# Patient Record
Sex: FEMALE | Race: WHITE | Employment: UNEMPLOYED | URBAN - METROPOLITAN AREA
[De-identification: names, ages, dates, MRNs, and addresses within clinical notes are randomized per-mention and may not be internally consistent; named-entity substitution may affect disease eponyms.]

---

## 2020-02-06 ENCOUNTER — APPOINTMENT (OUTPATIENT)
Dept: GENERAL RADIOLOGY | Age: 55
End: 2020-02-06
Payer: MEDICARE

## 2020-02-06 ENCOUNTER — APPOINTMENT (OUTPATIENT)
Dept: CT IMAGING | Age: 55
End: 2020-02-06
Payer: MEDICARE

## 2020-02-06 ENCOUNTER — HOSPITAL ENCOUNTER (EMERGENCY)
Age: 55
Discharge: HOME | End: 2020-02-06
Attending: EMERGENCY MEDICINE
Payer: MEDICARE

## 2020-02-06 VITALS
HEIGHT: 68 IN | TEMPERATURE: 97.6 F | WEIGHT: 165 LBS | RESPIRATION RATE: 16 BRPM | SYSTOLIC BLOOD PRESSURE: 129 MMHG | HEART RATE: 75 BPM | OXYGEN SATURATION: 95 % | BODY MASS INDEX: 25.01 KG/M2 | DIASTOLIC BLOOD PRESSURE: 73 MMHG

## 2020-02-06 LAB
ALBUMIN SERPL-MCNC: 4.1 G/DL (ref 3.5–5.2)
ALP BLD-CCNC: 76 U/L (ref 35–104)
ALT SERPL-CCNC: 9 U/L (ref 0–32)
ANION GAP SERPL CALCULATED.3IONS-SCNC: 11 MMOL/L (ref 7–16)
AST SERPL-CCNC: 8 U/L (ref 0–31)
BASOPHILS ABSOLUTE: 0.03 E9/L (ref 0–0.2)
BASOPHILS RELATIVE PERCENT: 0.4 % (ref 0–2)
BILIRUB SERPL-MCNC: 0.2 MG/DL (ref 0–1.2)
BUN BLDV-MCNC: 13 MG/DL (ref 6–20)
CALCIUM SERPL-MCNC: 8.9 MG/DL (ref 8.6–10.2)
CHLORIDE BLD-SCNC: 99 MMOL/L (ref 98–107)
CO2: 23 MMOL/L (ref 22–29)
CREAT SERPL-MCNC: 1 MG/DL (ref 0.5–1)
EOSINOPHILS ABSOLUTE: 0.11 E9/L (ref 0.05–0.5)
EOSINOPHILS RELATIVE PERCENT: 1.6 % (ref 0–6)
GFR AFRICAN AMERICAN: >60
GFR NON-AFRICAN AMERICAN: 58 ML/MIN/1.73
GLUCOSE BLD-MCNC: 156 MG/DL (ref 74–99)
HCT VFR BLD CALC: 40.3 % (ref 34–48)
HEMOGLOBIN: 13.3 G/DL (ref 11.5–15.5)
IMMATURE GRANULOCYTES #: 0.02 E9/L
IMMATURE GRANULOCYTES %: 0.3 % (ref 0–5)
INFLUENZA A BY PCR: NOT DETECTED
INFLUENZA B BY PCR: NOT DETECTED
LYMPHOCYTES ABSOLUTE: 2.35 E9/L (ref 1.5–4)
LYMPHOCYTES RELATIVE PERCENT: 33.5 % (ref 20–42)
MCH RBC QN AUTO: 32.2 PG (ref 26–35)
MCHC RBC AUTO-ENTMCNC: 33 % (ref 32–34.5)
MCV RBC AUTO: 97.6 FL (ref 80–99.9)
MONOCYTES ABSOLUTE: 0.48 E9/L (ref 0.1–0.95)
MONOCYTES RELATIVE PERCENT: 6.8 % (ref 2–12)
NEUTROPHILS ABSOLUTE: 4.03 E9/L (ref 1.8–7.3)
NEUTROPHILS RELATIVE PERCENT: 57.4 % (ref 43–80)
PDW BLD-RTO: 12.7 FL (ref 11.5–15)
PLATELET # BLD: 378 E9/L (ref 130–450)
PMV BLD AUTO: 8.8 FL (ref 7–12)
POTASSIUM REFLEX MAGNESIUM: 4 MMOL/L (ref 3.5–5)
RBC # BLD: 4.13 E12/L (ref 3.5–5.5)
SODIUM BLD-SCNC: 133 MMOL/L (ref 132–146)
TOTAL PROTEIN: 6.8 G/DL (ref 6.4–8.3)
WBC # BLD: 7 E9/L (ref 4.5–11.5)

## 2020-02-06 PROCEDURE — 80053 COMPREHEN METABOLIC PANEL: CPT

## 2020-02-06 PROCEDURE — 72125 CT NECK SPINE W/O DYE: CPT

## 2020-02-06 PROCEDURE — 36415 COLL VENOUS BLD VENIPUNCTURE: CPT

## 2020-02-06 PROCEDURE — 85025 COMPLETE CBC W/AUTO DIFF WBC: CPT

## 2020-02-06 PROCEDURE — 70450 CT HEAD/BRAIN W/O DYE: CPT

## 2020-02-06 PROCEDURE — 87502 INFLUENZA DNA AMP PROBE: CPT

## 2020-02-06 PROCEDURE — 99284 EMERGENCY DEPT VISIT MOD MDM: CPT

## 2020-02-06 PROCEDURE — 71101 X-RAY EXAM UNILAT RIBS/CHEST: CPT

## 2020-02-06 ASSESSMENT — PAIN DESCRIPTION - DESCRIPTORS: DESCRIPTORS: ACHING;DISCOMFORT

## 2020-02-06 ASSESSMENT — PAIN DESCRIPTION - PAIN TYPE: TYPE: ACUTE PAIN

## 2020-02-06 ASSESSMENT — PAIN DESCRIPTION - ONSET: ONSET: ON-GOING

## 2020-02-06 ASSESSMENT — PAIN DESCRIPTION - FREQUENCY: FREQUENCY: CONTINUOUS

## 2020-02-06 ASSESSMENT — PAIN DESCRIPTION - LOCATION: LOCATION: RIB CAGE;HEAD

## 2020-02-06 ASSESSMENT — PAIN SCALES - GENERAL: PAINLEVEL_OUTOF10: 5

## 2021-04-11 ENCOUNTER — APPOINTMENT (OUTPATIENT)
Dept: GENERAL RADIOLOGY | Age: 56
End: 2021-04-11
Payer: MEDICARE

## 2021-04-11 ENCOUNTER — HOSPITAL ENCOUNTER (OUTPATIENT)
Age: 56
Setting detail: OBSERVATION
Discharge: HOME | End: 2021-04-14
Attending: EMERGENCY MEDICINE | Admitting: FAMILY MEDICINE
Payer: MEDICARE

## 2021-04-11 DIAGNOSIS — R07.9 CHEST PAIN, UNSPECIFIED TYPE: Primary | ICD-10-CM

## 2021-04-11 PROBLEM — F41.9 ANXIETY: Status: ACTIVE | Noted: 2021-04-11

## 2021-04-11 PROBLEM — I44.7 LBBB (LEFT BUNDLE BRANCH BLOCK): Status: ACTIVE | Noted: 2021-04-11

## 2021-04-11 PROBLEM — G80.9 CEREBRAL PALSY (HCC): Status: ACTIVE | Noted: 2021-04-11

## 2021-04-11 PROBLEM — I10 HTN (HYPERTENSION): Status: ACTIVE | Noted: 2021-04-11

## 2021-04-11 LAB
ALBUMIN SERPL-MCNC: 3.8 G/DL (ref 3.5–5.2)
ALP BLD-CCNC: 69 U/L (ref 35–104)
ALT SERPL-CCNC: 11 U/L (ref 0–32)
ANION GAP SERPL CALCULATED.3IONS-SCNC: 7 MMOL/L (ref 7–16)
AST SERPL-CCNC: 16 U/L (ref 0–31)
BASOPHILS ABSOLUTE: 0.05 E9/L (ref 0–0.2)
BASOPHILS RELATIVE PERCENT: 0.6 % (ref 0–2)
BILIRUB SERPL-MCNC: <0.2 MG/DL (ref 0–1.2)
BUN BLDV-MCNC: 16 MG/DL (ref 6–20)
CALCIUM SERPL-MCNC: 8.9 MG/DL (ref 8.6–10.2)
CHLORIDE BLD-SCNC: 104 MMOL/L (ref 98–107)
CO2: 25 MMOL/L (ref 22–29)
CREAT SERPL-MCNC: 0.7 MG/DL (ref 0.5–1)
D DIMER: <200 NG/ML DDU
EOSINOPHILS ABSOLUTE: 0.12 E9/L (ref 0.05–0.5)
EOSINOPHILS RELATIVE PERCENT: 1.4 % (ref 0–6)
GFR AFRICAN AMERICAN: >60
GFR NON-AFRICAN AMERICAN: >60 ML/MIN/1.73
GLUCOSE BLD-MCNC: 103 MG/DL (ref 74–99)
HCT VFR BLD CALC: 40.6 % (ref 34–48)
HEMOGLOBIN: 13.9 G/DL (ref 11.5–15.5)
IMMATURE GRANULOCYTES #: 0.03 E9/L
IMMATURE GRANULOCYTES %: 0.4 % (ref 0–5)
LYMPHOCYTES ABSOLUTE: 2.52 E9/L (ref 1.5–4)
LYMPHOCYTES RELATIVE PERCENT: 29.8 % (ref 20–42)
MCH RBC QN AUTO: 33.6 PG (ref 26–35)
MCHC RBC AUTO-ENTMCNC: 34.2 % (ref 32–34.5)
MCV RBC AUTO: 98.1 FL (ref 80–99.9)
MONOCYTES ABSOLUTE: 0.62 E9/L (ref 0.1–0.95)
MONOCYTES RELATIVE PERCENT: 7.3 % (ref 2–12)
NEUTROPHILS ABSOLUTE: 5.12 E9/L (ref 1.8–7.3)
NEUTROPHILS RELATIVE PERCENT: 60.5 % (ref 43–80)
PDW BLD-RTO: 12.5 FL (ref 11.5–15)
PLATELET # BLD: 210 E9/L (ref 130–450)
PMV BLD AUTO: 9.5 FL (ref 7–12)
POTASSIUM REFLEX MAGNESIUM: 3.6 MMOL/L (ref 3.5–5)
PRO-BNP: 321 PG/ML (ref 0–125)
RBC # BLD: 4.14 E12/L (ref 3.5–5.5)
REASON FOR REJECTION: NORMAL
REJECTED TEST: NORMAL
SODIUM BLD-SCNC: 136 MMOL/L (ref 132–146)
T4 TOTAL: 4.8 MCG/DL (ref 4.5–11.7)
TOTAL PROTEIN: 5.7 G/DL (ref 6.4–8.3)
TROPONIN: <0.01 NG/ML (ref 0–0.03)
TSH SERPL DL<=0.05 MIU/L-ACNC: 1.32 UIU/ML (ref 0.27–4.2)
WBC # BLD: 8.5 E9/L (ref 4.5–11.5)

## 2021-04-11 PROCEDURE — 85025 COMPLETE CBC W/AUTO DIFF WBC: CPT

## 2021-04-11 PROCEDURE — 6370000000 HC RX 637 (ALT 250 FOR IP): Performed by: STUDENT IN AN ORGANIZED HEALTH CARE EDUCATION/TRAINING PROGRAM

## 2021-04-11 PROCEDURE — 99285 EMERGENCY DEPT VISIT HI MDM: CPT

## 2021-04-11 PROCEDURE — G0378 HOSPITAL OBSERVATION PER HR: HCPCS

## 2021-04-11 PROCEDURE — 93005 ELECTROCARDIOGRAM TRACING: CPT | Performed by: STUDENT IN AN ORGANIZED HEALTH CARE EDUCATION/TRAINING PROGRAM

## 2021-04-11 PROCEDURE — 85378 FIBRIN DEGRADE SEMIQUANT: CPT

## 2021-04-11 PROCEDURE — 84436 ASSAY OF TOTAL THYROXINE: CPT

## 2021-04-11 PROCEDURE — 80053 COMPREHEN METABOLIC PANEL: CPT

## 2021-04-11 PROCEDURE — 84484 ASSAY OF TROPONIN QUANT: CPT

## 2021-04-11 PROCEDURE — 84443 ASSAY THYROID STIM HORMONE: CPT

## 2021-04-11 PROCEDURE — 2580000003 HC RX 258: Performed by: STUDENT IN AN ORGANIZED HEALTH CARE EDUCATION/TRAINING PROGRAM

## 2021-04-11 PROCEDURE — 6370000000 HC RX 637 (ALT 250 FOR IP): Performed by: FAMILY MEDICINE

## 2021-04-11 PROCEDURE — 83880 ASSAY OF NATRIURETIC PEPTIDE: CPT

## 2021-04-11 PROCEDURE — 71045 X-RAY EXAM CHEST 1 VIEW: CPT

## 2021-04-11 PROCEDURE — 36415 COLL VENOUS BLD VENIPUNCTURE: CPT

## 2021-04-11 RX ORDER — HEPARIN SODIUM 10000 [USP'U]/ML
5000 INJECTION, SOLUTION INTRAVENOUS; SUBCUTANEOUS EVERY 12 HOURS
Status: DISCONTINUED | OUTPATIENT
Start: 2021-04-12 | End: 2021-04-11

## 2021-04-11 RX ORDER — SODIUM CHLORIDE 9 MG/ML
25 INJECTION, SOLUTION INTRAVENOUS PRN
Status: DISCONTINUED | OUTPATIENT
Start: 2021-04-11 | End: 2021-04-14 | Stop reason: SDUPTHER

## 2021-04-11 RX ORDER — ACETAMINOPHEN 325 MG/1
650 TABLET ORAL EVERY 6 HOURS PRN
Status: DISCONTINUED | OUTPATIENT
Start: 2021-04-11 | End: 2021-04-14 | Stop reason: SDUPTHER

## 2021-04-11 RX ORDER — ASPIRIN 81 MG/1
324 TABLET, CHEWABLE ORAL ONCE
Status: COMPLETED | OUTPATIENT
Start: 2021-04-11 | End: 2021-04-11

## 2021-04-11 RX ORDER — LISINOPRIL 20 MG/1
20 TABLET ORAL DAILY
Status: DISCONTINUED | OUTPATIENT
Start: 2021-04-12 | End: 2021-04-14

## 2021-04-11 RX ORDER — PAROXETINE HYDROCHLORIDE 20 MG/1
20 TABLET, FILM COATED ORAL EVERY MORNING
Status: DISCONTINUED | OUTPATIENT
Start: 2021-04-12 | End: 2021-04-14 | Stop reason: HOSPADM

## 2021-04-11 RX ORDER — IBUPROFEN 200 MG
600 TABLET ORAL EVERY 8 HOURS PRN
COMMUNITY

## 2021-04-11 RX ORDER — CLONAZEPAM 0.5 MG/1
0.5 TABLET ORAL 2 TIMES DAILY
COMMUNITY

## 2021-04-11 RX ORDER — 0.9 % SODIUM CHLORIDE 0.9 %
1000 INTRAVENOUS SOLUTION INTRAVENOUS ONCE
Status: COMPLETED | OUTPATIENT
Start: 2021-04-11 | End: 2021-04-11

## 2021-04-11 RX ORDER — VITAMIN E (DL,TOCOPHERYL ACET) 180 MG
1 CAPSULE ORAL DAILY
COMMUNITY

## 2021-04-11 RX ORDER — NITROGLYCERIN 0.4 MG/1
0.4 TABLET SUBLINGUAL EVERY 5 MIN PRN
Status: DISCONTINUED | OUTPATIENT
Start: 2021-04-11 | End: 2021-04-12

## 2021-04-11 RX ORDER — LISINOPRIL 20 MG/1
20 TABLET ORAL DAILY
Status: ON HOLD | COMMUNITY
End: 2021-04-14 | Stop reason: HOSPADM

## 2021-04-11 RX ORDER — PROMETHAZINE HYDROCHLORIDE 25 MG/1
12.5 TABLET ORAL EVERY 6 HOURS PRN
Status: DISCONTINUED | OUTPATIENT
Start: 2021-04-11 | End: 2021-04-14 | Stop reason: HOSPADM

## 2021-04-11 RX ORDER — SODIUM CHLORIDE 0.9 % (FLUSH) 0.9 %
5-40 SYRINGE (ML) INJECTION EVERY 12 HOURS SCHEDULED
Status: DISCONTINUED | OUTPATIENT
Start: 2021-04-11 | End: 2021-04-14 | Stop reason: SDUPTHER

## 2021-04-11 RX ORDER — ACETAMINOPHEN 650 MG/1
650 SUPPOSITORY RECTAL EVERY 6 HOURS PRN
Status: DISCONTINUED | OUTPATIENT
Start: 2021-04-11 | End: 2021-04-14 | Stop reason: HOSPADM

## 2021-04-11 RX ORDER — SODIUM CHLORIDE 0.9 % (FLUSH) 0.9 %
5-40 SYRINGE (ML) INJECTION PRN
Status: DISCONTINUED | OUTPATIENT
Start: 2021-04-11 | End: 2021-04-14 | Stop reason: SDUPTHER

## 2021-04-11 RX ORDER — POLYETHYLENE GLYCOL 3350 17 G/17G
17 POWDER, FOR SOLUTION ORAL DAILY PRN
Status: DISCONTINUED | OUTPATIENT
Start: 2021-04-11 | End: 2021-04-14 | Stop reason: HOSPADM

## 2021-04-11 RX ORDER — PAROXETINE HYDROCHLORIDE 20 MG/1
20 TABLET, FILM COATED ORAL EVERY MORNING
COMMUNITY

## 2021-04-11 RX ORDER — HEPARIN SODIUM 10000 [USP'U]/ML
5000 INJECTION, SOLUTION INTRAVENOUS; SUBCUTANEOUS EVERY 8 HOURS
Status: DISCONTINUED | OUTPATIENT
Start: 2021-04-11 | End: 2021-04-14 | Stop reason: HOSPADM

## 2021-04-11 RX ORDER — CLONAZEPAM 0.5 MG/1
0.5 TABLET ORAL 2 TIMES DAILY PRN
Status: DISCONTINUED | OUTPATIENT
Start: 2021-04-11 | End: 2021-04-14 | Stop reason: HOSPADM

## 2021-04-11 RX ORDER — ASPIRIN 81 MG/1
81 TABLET, CHEWABLE ORAL DAILY
Status: DISCONTINUED | OUTPATIENT
Start: 2021-04-12 | End: 2021-04-14 | Stop reason: HOSPADM

## 2021-04-11 RX ORDER — ONDANSETRON 2 MG/ML
4 INJECTION INTRAMUSCULAR; INTRAVENOUS EVERY 6 HOURS PRN
Status: DISCONTINUED | OUTPATIENT
Start: 2021-04-11 | End: 2021-04-14 | Stop reason: HOSPADM

## 2021-04-11 RX ADMIN — ACETAMINOPHEN 650 MG: 325 TABLET ORAL at 21:32

## 2021-04-11 RX ADMIN — ASPIRIN 81 MG CHEWABLE TABLET 324 MG: 81 TABLET CHEWABLE at 17:49

## 2021-04-11 RX ADMIN — SODIUM CHLORIDE 1000 ML: 9 INJECTION, SOLUTION INTRAVENOUS at 17:49

## 2021-04-11 ASSESSMENT — PAIN DESCRIPTION - LOCATION
LOCATION: CHEST
LOCATION: ARM

## 2021-04-11 ASSESSMENT — PAIN DESCRIPTION - PAIN TYPE: TYPE: ACUTE PAIN

## 2021-04-11 ASSESSMENT — PAIN SCALES - GENERAL: PAINLEVEL_OUTOF10: 8

## 2021-04-11 ASSESSMENT — PAIN DESCRIPTION - DESCRIPTORS: DESCRIPTORS: SHARP

## 2021-04-11 NOTE — ED PROVIDER NOTES
Department of Emergency Medicine   ED  Provider Note  Admit Date/RoomTime: 4/11/2021  5:26 PM  ED Room: 08/08          History of Present Illness:  4/11/21, Time: 5:34 PM EDT  Chief Complaint   Patient presents with    Chest Pain     left sided non radiating chest pain ongoing since this am          Trang Xavier is a 54 y.o. female presenting to the ED for chest pain, beginning this morning. The complaint has been persistent, moderate in severity, and worsened by nothing. The patient is a 49-year-old female with a history of cerebral palsy and hypertension who presents to the emergency department complaining of chest pain. Patient symptoms are sudden in onset this morning when she woke up, persistent, moderate in severity, and nothing makes it better or worse. Patient states she is not had pain like this before. The pain is on the left side of her chest that she describes as a dull aching sensation and states at times he gets sharp. It is nonradiating. The patient states that she is short of breath at times, but she currently does not feel short of breath. She denies any lightheadedness, dizziness, syncope, numbness, tingling, unilateral weakness, palpitations, or other acute symptoms or concerns. She states that she had a cardiac catheterization done approximately 20 years ago due to an abnormal echocardiogram.  She states that she does have a strong family history of heart problems as her father had bypass and multiple MIs in his 46s. Review of Systems:   Pertinent positives and negatives are stated within HPI, all other systems reviewed and are negative.        --------------------------------------------- PAST HISTORY ---------------------------------------------  Past Medical History:  has a past medical history of Cerebral palsy ([facility] [address] and Hypertension. Past Surgical History:  has no past surgical history on file. Social History:  reports that she has been smoking.  She has been Hemoglobin 13.9 11.5 - 15.5 g/dL    Hematocrit 40.6 34.0 - 48.0 %    MCV 98.1 80.0 - 99.9 fL    MCH 33.6 26.0 - 35.0 pg    MCHC 34.2 32.0 - 34.5 %    RDW 12.5 11.5 - 15.0 fL    Platelets 999 697 - 658 E9/L    MPV 9.5 7.0 - 12.0 fL    Neutrophils % 60.5 43.0 - 80.0 %    Immature Granulocytes % 0.4 0.0 - 5.0 %    Lymphocytes % 29.8 20.0 - 42.0 %    Monocytes % 7.3 2.0 - 12.0 %    Eosinophils % 1.4 0.0 - 6.0 %    Basophils % 0.6 0.0 - 2.0 %    Neutrophils Absolute 5.12 1.80 - 7.30 E9/L    Immature Granulocytes # 0.03 E9/L    Lymphocytes Absolute 2.52 1.50 - 4.00 E9/L    Monocytes Absolute 0.62 0.10 - 0.95 E9/L    Eosinophils Absolute 0.12 0.05 - 0.50 E9/L    Basophils Absolute 0.05 0.00 - 0.20 E9/L   Brain Natriuretic Peptide   Result Value Ref Range    Pro- (H) 0 - 125 pg/mL   SPECIMEN REJECTION   Result Value Ref Range    Rejected Test TROP, CMPX, TSH     Reason for Rejection see below    Troponin   Result Value Ref Range    Troponin <0.01 0.00 - 0.03 ng/mL   T4   Result Value Ref Range    T4, Total 4.8 4.5 - 11.7 mcg/dL   TSH WITHOUT REFLEX   Result Value Ref Range    TSH 1.320 0.270 - 4.200 uIU/mL   Comprehensive Metabolic Panel w/ Reflex to MG   Result Value Ref Range    Sodium 136 132 - 146 mmol/L    Potassium reflex Magnesium 3.6 3.5 - 5.0 mmol/L    Chloride 104 98 - 107 mmol/L    CO2 25 22 - 29 mmol/L    Anion Gap 7 7 - 16 mmol/L    Glucose 103 (H) 74 - 99 mg/dL    BUN 16 6 - 20 mg/dL    CREATININE 0.7 0.5 - 1.0 mg/dL    GFR Non-African American >60 >=60 mL/min/1.73    GFR African American >60     Calcium 8.9 8.6 - 10.2 mg/dL    Total Protein 5.7 (L) 6.4 - 8.3 g/dL    Albumin 3.8 3.5 - 5.2 g/dL    Total Bilirubin <0.2 0.0 - 1.2 mg/dL    Alkaline Phosphatase 69 35 - 104 U/L    ALT 11 0 - 32 U/L    AST 16 0 - 31 U/L   ,       RADIOLOGY:  Interpreted by Radiologist unless otherwise specified  XR CHEST PORTABLE   Final Result   1.   There is a poorly defined asymmetrical density projecting over the right lower lung that could represent pleural thickening. 2.  No signs of an acute cardiopulmonary process otherwise               EKG Interpretation  Interpreted by Lenin Lorenzo    Date of EK21  Time: 1730    Rhythm: normal sinus   Rate: normal  Axis: normal  Conduction: left bundle branch block (complete)  ST Segments: nonspecific changes  T Waves: non specific changes    Clinical Impression: Normal sinus rhythm, left atrial enlargement, left bundle branch block, nonspecific ST changes throughout, QTC is 484, intervals within normal limits  Comparison to prior EKG: no previous EKG      ------------------------- NURSING NOTES AND VITALS REVIEWED ---------------------------   The nursing notes within the ED encounter and vital signs as below have been reviewed by myself  BP (!) 143/85   Pulse 58   Temp 98 °F (36.7 °C)   Resp 19   Ht 5' 8\" (1.727 m)   Wt 75 lb (34 kg)   SpO2 93%   BMI 11.40 kg/m²     Oxygen Saturation Interpretation: 93 % on room air. Normal    The patients available past medical records and past encounters were reviewed. ------------------------------ ED COURSE/MEDICAL DECISION MAKING----------------------  Medications   0.9 % sodium chloride bolus (1,000 mLs Intravenous New Bag 21)   aspirin chewable tablet 324 mg (324 mg Oral Given 21)           The cardiac monitor revealed NSR with a heart rate in the 50s as interpreted by me. The cardiac monitor was ordered secondary to the patient's chest pain and to monitor the patient for dysrhythmia. CPT [id number]           Medical Decision Making:     The patient was seen and evaluated by the Attending Emergency Medicine Physician Dr. Zeina Ruiz. The patient is a 59-year-old female presents to the emergency department complaining of chest pain. She is hemodynamically stable, nontoxic appearing, in no acute distress. She was treated with a dose of aspirin and 1 L of IV fluids.   The patient was found to have a left bundle branch block on her EKG. Troponin is negative. Labs are otherwise within normal limits. There is no previous EKG to compare. Chest x-ray did not show acute abnormalities. With patient's strong cardiac family history we will plan on inpatient admission and cardiac work-up. Discussed results in depth with patient she verbalized understanding agreement to treatment plan admission. Consulted with hospitalist who accepted the patient for admission. Re-Evaluations:  ED Course as of Apr 11 1945   Sun Apr 11, 2021 1928 Consulted with Dr. Matthew Armenta, hospitalist, who accepted for admission. [KG]      ED Course User Index  [KG] Scooter DO Rea       This patient's ED course included: a personal history and physicial examination, re-evaluation prior to disposition, multiple bedside re-evaluations, IV medications, cardiac monitoring, continuous pulse oximetry and complex medical decision making and emergency management    This patient has remained hemodynamically stable during their ED course. Consultations:  Hospitalist, accepted for admission. Counseling: The emergency provider has spoken with the patient and discussed todays results, in addition to providing specific details for the plan of care and counseling regarding the diagnosis and prognosis. Questions are answered at this time and they are agreeable with the plan.       --------------------------------- IMPRESSION AND DISPOSITION ---------------------------------    IMPRESSION  1. Chest pain, unspecified type        DISPOSITION  Disposition: Admit to telemetry  Patient condition is stable        NOTE: This report was transcribed using voice recognition software.  Every effort was made to ensure accuracy; however, inadvertent computerized transcription errors may be present        Scooter Lucia DO  Resident  04/11/21 1945

## 2021-04-12 ENCOUNTER — APPOINTMENT (OUTPATIENT)
Dept: CT IMAGING | Age: 56
End: 2021-04-12
Payer: MEDICARE

## 2021-04-12 LAB
ANION GAP SERPL CALCULATED.3IONS-SCNC: 9 MMOL/L (ref 7–16)
BUN BLDV-MCNC: 11 MG/DL (ref 6–20)
CALCIUM SERPL-MCNC: 8.7 MG/DL (ref 8.6–10.2)
CHLORIDE BLD-SCNC: 107 MMOL/L (ref 98–107)
CHOLESTEROL, TOTAL: 146 MG/DL (ref 0–199)
CO2: 24 MMOL/L (ref 22–29)
CREAT SERPL-MCNC: 0.7 MG/DL (ref 0.5–1)
EKG ATRIAL RATE: 77 BPM
EKG ATRIAL RATE: 77 BPM
EKG P AXIS: 69 DEGREES
EKG P AXIS: 69 DEGREES
EKG P-R INTERVAL: 150 MS
EKG P-R INTERVAL: 150 MS
EKG Q-T INTERVAL: 428 MS
EKG Q-T INTERVAL: 428 MS
EKG QRS DURATION: 148 MS
EKG QRS DURATION: 148 MS
EKG QTC CALCULATION (BAZETT): 484 MS
EKG QTC CALCULATION (BAZETT): 484 MS
EKG R AXIS: 28 DEGREES
EKG R AXIS: 28 DEGREES
EKG T AXIS: 101 DEGREES
EKG T AXIS: 101 DEGREES
EKG VENTRICULAR RATE: 77 BPM
EKG VENTRICULAR RATE: 77 BPM
GFR AFRICAN AMERICAN: >60
GFR NON-AFRICAN AMERICAN: >60 ML/MIN/1.73
GLUCOSE BLD-MCNC: 109 MG/DL (ref 74–99)
HCT VFR BLD CALC: 38.2 % (ref 34–48)
HDLC SERPL-MCNC: 56 MG/DL
HEMOGLOBIN: 13 G/DL (ref 11.5–15.5)
LDL CHOLESTEROL CALCULATED: 69 MG/DL (ref 0–99)
MAGNESIUM: 1.8 MG/DL (ref 1.6–2.6)
MCH RBC QN AUTO: 33.2 PG (ref 26–35)
MCHC RBC AUTO-ENTMCNC: 34 % (ref 32–34.5)
MCV RBC AUTO: 97.4 FL (ref 80–99.9)
PDW BLD-RTO: 12.5 FL (ref 11.5–15)
PLATELET # BLD: 211 E9/L (ref 130–450)
PMV BLD AUTO: 9.8 FL (ref 7–12)
POTASSIUM REFLEX MAGNESIUM: 3.4 MMOL/L (ref 3.5–5)
RBC # BLD: 3.92 E12/L (ref 3.5–5.5)
SARS-COV-2, NAAT: NOT DETECTED
SODIUM BLD-SCNC: 140 MMOL/L (ref 132–146)
TRIGL SERPL-MCNC: 105 MG/DL (ref 0–149)
TROPONIN: <0.01 NG/ML (ref 0–0.03)
TROPONIN: <0.01 NG/ML (ref 0–0.03)
VLDLC SERPL CALC-MCNC: 21 MG/DL
WBC # BLD: 7.5 E9/L (ref 4.5–11.5)

## 2021-04-12 PROCEDURE — 84484 ASSAY OF TROPONIN QUANT: CPT

## 2021-04-12 PROCEDURE — 6370000000 HC RX 637 (ALT 250 FOR IP): Performed by: FAMILY MEDICINE

## 2021-04-12 PROCEDURE — 87635 SARS-COV-2 COVID-19 AMP PRB: CPT

## 2021-04-12 PROCEDURE — 6370000000 HC RX 637 (ALT 250 FOR IP): Performed by: INTERNAL MEDICINE

## 2021-04-12 PROCEDURE — 80048 BASIC METABOLIC PNL TOTAL CA: CPT

## 2021-04-12 PROCEDURE — 36415 COLL VENOUS BLD VENIPUNCTURE: CPT

## 2021-04-12 PROCEDURE — 6360000004 HC RX CONTRAST MEDICATION: Performed by: RADIOLOGY

## 2021-04-12 PROCEDURE — APPSS60 APP SPLIT SHARED TIME 46-60 MINUTES: Performed by: PHYSICIAN ASSISTANT

## 2021-04-12 PROCEDURE — 2580000003 HC RX 258: Performed by: FAMILY MEDICINE

## 2021-04-12 PROCEDURE — 2580000003 HC RX 258: Performed by: INTERNAL MEDICINE

## 2021-04-12 PROCEDURE — 99203 OFFICE O/P NEW LOW 30 MIN: CPT | Performed by: INTERNAL MEDICINE

## 2021-04-12 PROCEDURE — 80061 LIPID PANEL: CPT

## 2021-04-12 PROCEDURE — 93010 ELECTROCARDIOGRAM REPORT: CPT | Performed by: INTERNAL MEDICINE

## 2021-04-12 PROCEDURE — 83735 ASSAY OF MAGNESIUM: CPT

## 2021-04-12 PROCEDURE — 85027 COMPLETE CBC AUTOMATED: CPT

## 2021-04-12 PROCEDURE — G0378 HOSPITAL OBSERVATION PER HR: HCPCS

## 2021-04-12 RX ORDER — CYCLOBENZAPRINE HCL 10 MG
10 TABLET ORAL 3 TIMES DAILY PRN
Status: DISCONTINUED | OUTPATIENT
Start: 2021-04-12 | End: 2021-04-14 | Stop reason: HOSPADM

## 2021-04-12 RX ORDER — SODIUM CHLORIDE 0.9 % (FLUSH) 0.9 %
10 SYRINGE (ML) INJECTION ONCE
Status: DISCONTINUED | OUTPATIENT
Start: 2021-04-12 | End: 2021-04-14 | Stop reason: HOSPADM

## 2021-04-12 RX ORDER — 0.9 % SODIUM CHLORIDE 0.9 %
1000 INTRAVENOUS SOLUTION INTRAVENOUS ONCE
Status: COMPLETED | OUTPATIENT
Start: 2021-04-12 | End: 2021-04-12

## 2021-04-12 RX ORDER — HYDROCODONE BITARTRATE AND ACETAMINOPHEN 5; 325 MG/1; MG/1
1 TABLET ORAL EVERY 6 HOURS PRN
Status: DISCONTINUED | OUTPATIENT
Start: 2021-04-12 | End: 2021-04-14 | Stop reason: HOSPADM

## 2021-04-12 RX ORDER — MORPHINE SULFATE 2 MG/ML
2 INJECTION, SOLUTION INTRAMUSCULAR; INTRAVENOUS EVERY 4 HOURS PRN
Status: DISCONTINUED | OUTPATIENT
Start: 2021-04-12 | End: 2021-04-14 | Stop reason: HOSPADM

## 2021-04-12 RX ORDER — HEPARIN SODIUM (PORCINE) LOCK FLUSH IV SOLN 100 UNIT/ML 100 UNIT/ML
1 SOLUTION INTRAVENOUS PRN
Status: DISCONTINUED | OUTPATIENT
Start: 2021-04-12 | End: 2021-04-14 | Stop reason: HOSPADM

## 2021-04-12 RX ORDER — SODIUM CHLORIDE 0.9 % (FLUSH) 0.9 %
5-40 SYRINGE (ML) INJECTION EVERY 12 HOURS SCHEDULED
Status: DISCONTINUED | OUTPATIENT
Start: 2021-04-12 | End: 2021-04-14 | Stop reason: SDUPTHER

## 2021-04-12 RX ORDER — SODIUM CHLORIDE 9 MG/ML
25 INJECTION, SOLUTION INTRAVENOUS PRN
Status: DISCONTINUED | OUTPATIENT
Start: 2021-04-12 | End: 2021-04-14 | Stop reason: SDUPTHER

## 2021-04-12 RX ORDER — METOPROLOL TARTRATE 5 MG/5ML
5 INJECTION INTRAVENOUS EVERY 5 MIN PRN
Status: DISCONTINUED | OUTPATIENT
Start: 2021-04-12 | End: 2021-04-14 | Stop reason: HOSPADM

## 2021-04-12 RX ORDER — NITROGLYCERIN 0.4 MG/1
0.4 TABLET SUBLINGUAL EVERY 5 MIN PRN
Status: DISCONTINUED | OUTPATIENT
Start: 2021-04-12 | End: 2021-04-14 | Stop reason: HOSPADM

## 2021-04-12 RX ORDER — METOPROLOL TARTRATE 50 MG/1
100 TABLET, FILM COATED ORAL
Status: ACTIVE | OUTPATIENT
Start: 2021-04-12 | End: 2021-04-12

## 2021-04-12 RX ORDER — SODIUM CHLORIDE 0.9 % (FLUSH) 0.9 %
5-40 SYRINGE (ML) INJECTION PRN
Status: DISCONTINUED | OUTPATIENT
Start: 2021-04-12 | End: 2021-04-14 | Stop reason: SDUPTHER

## 2021-04-12 RX ORDER — DILTIAZEM HYDROCHLORIDE 5 MG/ML
10 INJECTION INTRAVENOUS
Status: ACTIVE | OUTPATIENT
Start: 2021-04-12 | End: 2021-04-12

## 2021-04-12 RX ORDER — METOPROLOL TARTRATE 50 MG/1
50 TABLET, FILM COATED ORAL ONCE
Status: COMPLETED | OUTPATIENT
Start: 2021-04-12 | End: 2021-04-12

## 2021-04-12 RX ORDER — NITROGLYCERIN 0.4 MG/1
0.8 TABLET SUBLINGUAL ONCE
Status: COMPLETED | OUTPATIENT
Start: 2021-04-12 | End: 2021-04-12

## 2021-04-12 RX ORDER — HEPARIN SODIUM (PORCINE) LOCK FLUSH IV SOLN 100 UNIT/ML 100 UNIT/ML
1 SOLUTION INTRAVENOUS EVERY 12 HOURS SCHEDULED
Status: DISCONTINUED | OUTPATIENT
Start: 2021-04-12 | End: 2021-04-14 | Stop reason: HOSPADM

## 2021-04-12 RX ORDER — NITROGLYCERIN 0.4 MG/1
TABLET SUBLINGUAL
Qty: 25 TABLET | Refills: 3 | Status: SHIPPED | OUTPATIENT
Start: 2021-04-12 | End: 2021-04-14

## 2021-04-12 RX ORDER — METOPROLOL TARTRATE 5 MG/5ML
5 INJECTION INTRAVENOUS EVERY 5 MIN PRN
Status: DISCONTINUED | OUTPATIENT
Start: 2021-04-12 | End: 2021-04-13 | Stop reason: ALTCHOICE

## 2021-04-12 RX ADMIN — HYDROCODONE BITARTRATE AND ACETAMINOPHEN 1 TABLET: 5; 325 TABLET ORAL at 05:36

## 2021-04-12 RX ADMIN — HYDROCODONE BITARTRATE AND ACETAMINOPHEN 1 TABLET: 5; 325 TABLET ORAL at 11:56

## 2021-04-12 RX ADMIN — IOPAMIDOL 120 ML: 755 INJECTION, SOLUTION INTRAVENOUS at 14:29

## 2021-04-12 RX ADMIN — ASPIRIN 81 MG CHEWABLE TABLET 81 MG: 81 TABLET CHEWABLE at 08:12

## 2021-04-12 RX ADMIN — PAROXETINE 20 MG: 20 TABLET, FILM COATED ORAL at 08:12

## 2021-04-12 RX ADMIN — LISINOPRIL 20 MG: 10 TABLET ORAL at 08:12

## 2021-04-12 RX ADMIN — SODIUM CHLORIDE 1000 ML: 9 INJECTION, SOLUTION INTRAVENOUS at 11:33

## 2021-04-12 RX ADMIN — SODIUM CHLORIDE, PRESERVATIVE FREE 10 ML: 5 INJECTION INTRAVENOUS at 08:13

## 2021-04-12 RX ADMIN — Medication 10 ML: at 14:29

## 2021-04-12 RX ADMIN — NITROGLYCERIN 0.8 MG: 0.4 TABLET SUBLINGUAL at 14:36

## 2021-04-12 RX ADMIN — METOPROLOL TARTRATE 50 MG: 50 TABLET, FILM COATED ORAL at 11:32

## 2021-04-12 RX ADMIN — HYDROCODONE BITARTRATE AND ACETAMINOPHEN 1 TABLET: 5; 325 TABLET ORAL at 20:24

## 2021-04-12 ASSESSMENT — PAIN DESCRIPTION - ORIENTATION: ORIENTATION: LEFT

## 2021-04-12 ASSESSMENT — PAIN SCALES - GENERAL: PAINLEVEL_OUTOF10: 7

## 2021-04-12 ASSESSMENT — PAIN DESCRIPTION - PAIN TYPE: TYPE: ACUTE PAIN

## 2021-04-12 ASSESSMENT — PAIN DESCRIPTION - DESCRIPTORS: DESCRIPTORS: DISCOMFORT

## 2021-04-12 NOTE — CARE COORDINATION
4/12, SW met with patient at bedside to discuss transition of care/discharge plan. Discharge plan is home once medically cleared for discharge. Patient lives with boyfriend in a 1 story home with 3 steps to enter the home. DME owned is [employer] and PCP is [facility]. Pharmacy is [facility] in [facility]. Patient would like PT to see her due to her saying her gait is different and seems to be changing on her. Patient does have an active PT order on. Will see what is further recommended for patient after PT eval is completed. Transportation will be sister. SW to follow for further discharge planning needs.     SANTOS Garner  [facility] Case Management  [phone]

## 2021-04-12 NOTE — CONSULTS
Inpatient Cardiology Consultation      Reason for Consult: Chest pain    Consulting Physician: Marie Gates MD    Requesting Physician:  Joyce Aaron DO    Date of Consultation: 4/12/2021    HISTORY OF PRESENT ILLNESS OF Gregg Maher located in  room 6315/6315-A:     Gregg Maher is a 54 y.o. female  unknown to [facility] . She has history hypertension, cerebral palsy, smoking, use of cannabis. She presented to ED of [facility] on 4/11/2021 at about 5: 20 p.m.  complaining of left-sided chest pain. Her vitals on arrival: Blood pressure 150/82 millimeters mercury, pulse 78, 121,  61, respiration 18: SPO2 97% on room air, afebrile. Labs: Troponin x2-<0.01, <0.01; pro-- . Sodium 136, potassium 3.6, creatinine 0.7. WBC 8.5, hemoglobin 13.9, platelet count 035. During the exam: patient was resting comfortable without any signs of distress; was cooperative; she has slurred speech due to cerebral palsy, complained of some residual left-sided chest pain which was significantly decreased by a Norco tablet given at about 5 AM, this pain is aggravated by left arm movement;  denied shortness of breath at rest, palpitations , lightheadedness, dizziness, cough, chills, fever, abdominal pains , nausea  and general tiredness. According to patient on  Saturday she cut the grass, played corn hole game also biked and did not have any health issues. Next day on Sunday she waked up feeling that her left side of chest is hurting, she describes it like aching, constant, progressing with the maximum reached about  7-8/10, there was no radiation of pain, pain was aggravated by deep breaths and movement of left arm, alleviated by laying still. She denied any nausea, diaphoresis. But felt slightly short of breath.   According to patient she saw a new cardiologist for about 10 years, she has had a stress heart cath at about age of 27 and until now she has had 5 stress test the last one (KLONOPIN) tablet 0.5 mg, 0.5 mg, Oral, BID PRN  lisinopril (PRINIVIL;ZESTRIL) tablet 20 mg, 20 mg, Oral, Daily  PARoxetine (PAXIL) tablet 20 mg, 20 mg, Oral, QAM  sodium chloride flush 0.9 % injection 5-40 mL, 5-40 mL, Intravenous, 2 times per day  sodium chloride flush 0.9 % injection 5-40 mL, 5-40 mL, Intravenous, PRN  0.9 % sodium chloride infusion, 25 mL, Intravenous, PRN  promethazine (PHENERGAN) tablet 12.5 mg, 12.5 mg, Oral, Q6H PRN **OR** ondansetron (ZOFRAN) injection 4 mg, 4 mg, Intravenous, Q6H PRN  acetaminophen (TYLENOL) tablet 650 mg, 650 mg, Oral, Q6H PRN **OR** acetaminophen (TYLENOL) suppository 650 mg, 650 mg, Rectal, Q6H PRN  polyethylene glycol (GLYCOLAX) packet 17 g, 17 g, Oral, Daily PRN  nitroGLYCERIN (NITROSTAT) SL tablet 0.4 mg, 0.4 mg, Sublingual, Q5 Min PRN  regadenoson (LEXISCAN) injection 0.4 mg, 0.4 mg, Intravenous, ONCE PRN  aspirin chewable tablet 81 mg, 81 mg, Oral, Daily  heparin (porcine) injection 5,000 Units, 5,000 Units, Subcutaneous, Q8H    Allergies:  Patient has no known allergies. Social History: Smoker. Smoked for 35 years about a pack a day. Alcohol occasionally. Uses cannabis daily.     Social History     Socioeconomic History    Marital status:      Spouse name: Not on file    Number of children: Not on file    Years of education: Not on file    Highest education level: Not on file   Occupational History    Not on file   Social Needs    Financial resource strain: Not on file    Food insecurity     Worry: Not on file     Inability: Not on file    Transportation needs     Medical: Not on file     Non-medical: Not on file   Tobacco Use    Smoking status: Current Every Day Smoker     Packs/day: 0.50    Smokeless tobacco: Never Used   Substance and Sexual Activity    Alcohol use: Yes     Comment: Socially    Drug use: Not on file    Sexual activity: Not on file   Lifestyle    Physical activity     Days per week: Not on file     Minutes per session: Not on file    Stress: Not on file   Relationships    Social connections     Talks on phone: Not on file     Gets together: Not on file     Attends [employer] service: Not on file     Active member of club or organization: Not on file     Attends meetings of clubs or organizations: Not on file     Relationship status: Not on file    Intimate partner violence     Fear of current or ex partner: Not on file     Emotionally abused: Not on file     Physically abused: Not on file     Forced sexual activity: Not on file   Other Topics Concern    Not on file   Social History Narrative    Not on file       Family History: Father has had MI at 43-year-old, CABG. Mother-hypertension, diabetes. Sister some heart rhythm/heart racing issues. REVIEW OF SYSTEMS:     Constitutional: Denies fatigue, fevers, chills, night sweats, ino loss, ino gain  HEENT: Denies headaches, nose bleeds, and blurred vision,oral pain, oral lesion. Neurological: Has cerebral palsy. Denies history of stroke, weakness, dizziness and lightheadedness, numbness and tingling  Cardiovascular: Left-sided chest pain. Episodic rare shortness of breath with effort. Denies palpitations, and feelings of heart racing. Respiratory: Denies cough, wheezing,  use of supplementary oxygen, CPAP/BiPAP  Gastrointestinal: Denies heartburn, nausea, vomiting, diarrhea and constipation, black/bloody, and tarry stools. Genitourinary:Denies pain on  urination,  blood in urine, trouble starting urination, need to strain on urinati Denies history of easy bruising, prolonged bleeding,  of blood clots in legs  Lymphatic: Denies lumps and bumps to neck, axilla, breast, and groin  Endocrine: Denies excessive thirst. Denies intolerance to heat or cold  Musculoskeletal: Denies falls, pain to BLE with ambulation and edema to BLE. Psychiatric: Denies anxiety and depression.     PHYSICAL EXAM:   /74   Pulse 68   Temp 98.5 °F (36.9 °C)   Resp 16   Ht 5' 8\" (1.727 m)   Wt 183 lb 6.4 oz (83.2 kg)   SpO2 98%   BMI 27.89 kg/m²   CONST:  Well developed, well nourished who appears stated age. Awake, alert, cooperative, no apparent distress  HEENT:   Head- Normocephalic, atraumatic   Eyes- Conjunctivae pink, anicteric  Throat- Oral mucosa pink and moist  Neck-  No stridor, trachea midline, no jugular venous distention. No adenopathy   CHEST: Chest symmetrical and non-tender to palpation. No accessory muscle use or intercostal retractions  RESPIRATORY:  Lung sounds - clear throughout fields  CARDIOVASCULAR:     No carotid bruits  Heart Inspection- shows no noted pulsations  Heart Ausculation- Regular rate and rhythm, no murmur. No s3, s4 or rub   PV: Feet pointing inward. Some fingers extension spasticity. No lower extremity edema. No varicosities. Pedal pulses palpable, no clubbing or cyanosis. Radial pulses +2 bilateral  ABDOMEN: Soft, non-tender to light palpation. Bowel sounds present. MS: Moves all extremities Good muscle strength and tone. No atrophy or abnormal movements. : Deferred  SKIN: Warm and dry,  no stasis dermatitis or ulcers on legs  NEURO / PSYCH: Oriented to person, place and time. Speech slurred due to cerebral palsy, appropriate. Follows all commands.  Pleasant affect     DATA:    ECG 4/11/21  ormal sinus rhythm  Possible Left atrial enlargement  Left bundle branch block  Abnormal ECG  Tele strips: SR  Diagnostic:  Chest x Ray 4/11/2021  1. Myke Motto is a poorly defined asymmetrical density projecting over the right  lower lung that could represent pleural thickening.     2.  No signs of an acute cardiopulmonary process otherwise          Labs:   CBC:   Recent Labs     04/11/21  1745 04/12/21  0424   WBC 8.5 7.5   HGB 13.9 13.0   HCT 40.6 38.2    211     BMP:   Recent Labs     04/11/21  1837 04/12/21  0424    140   K 3.6 3.4*   CO2 25 24   BUN 16 11   CREATININE 0.7 0.7   LABGLOM >60 >60   CALCIUM 8.9 8.7     Mag:   Recent Labs 04/12/21  0424   MG 1.8     Phos: No results for input(s): PHOS in the last 72 hours. TSH:   Recent Labs     04/11/21 1837   TSH 1.320     CARDIAC ENZYMES:  Recent Labs     04/11/21 1837 04/12/21  0135   TROPONINI <0.01 <0.01     FASTING LIPID PANEL:  Lab Results   Component Value Date    CHOL 146 04/12/2021    HDL 56 04/12/2021    LDLCALC 69 04/12/2021    TRIG 105 04/12/2021     LIVER PROFILE:  Recent Labs     04/11/21 1837   AST 16   ALT 11   LABALBU 3.8           ASSESSMENT:  1. Atypical chest pain  2. Hypertension  3. Newly identified left bundle branch block  4. Cerebral palsy -fairly functional  5. Smoking  6. Overweight (BMI 27.89 kg/square meter)  7. Family history of premature coronary disease    PLAN:  · Continue blood pressure medications  - lisinopril, Lopressor  · Continue Telemetry  · Coronary CTA for  tomorrow   ·     Assessment and plan discussed with Dr. Aide Wilcox MD    Electronically signed by JOSE CARLOS Dsouza on 4/12/2021 at 7:32 AM     PHYSICIAN ADDENDUM:  I independently interviewed and examined the patient. I have reviewed the above documentation completed by the MARIA EUGENIA. Please see my additional contributions to the HPI, physical exam, and assessment / medical decision making. I independently reviewed the HPI, ROS, PMH, PSH, [address], SH, and medications independently and agree with above documentation.     I discussed the assessment and plan with the patient/family at the bedside as well as the bedside nurse and the primary team.    Aide Wilcox MD, [facility]  Interventional Cardiology/Structural Heart Disease  Office: [phone]

## 2021-04-12 NOTE — PROGRESS NOTES
Patient arrived via cart to CT for Coronary CTA to be administered per protocol. Medical history, labs, and medications have been reviewed. PT placed onto table, /81, HR 57  Nitro given at 1436, patient scanned 3 minutes after dose    Post BP taken at 1439     1442 - Pt's IV infiltrated. Dr. Deann Wright notified and states will come to look at patient. 1- Dr. Deann Wright here. Unable to complete the rest of the test. Ordered to have an ice pack to affected site 15min on and 5 min off for four hours. To keep arm elevated. States will be talking with Dr. Cesar Chua for an alternative plan.      Report called to Ria Bradshaw

## 2021-04-12 NOTE — H&P
Hospital Medicine History & Physical      PCP: Pelon Benito MD    Date of Admission: 4/11/2021    Date of Service: Pt seen/examined on 4/11/2021 and Placed in Observation. Chief Complaint: Chest pain      History Of Present Illness:      54 y.o. female who presented to [facility] with past medical history of hypertension, anxiety, and cerebral palsy. Patient started having pain in the left upper chest this morning, she thought she had pulled a muscle, pain is worse with deep breathing and movement of the left arm, described as achy, mild to moderate in intensity, radiated towards the left shoulder, associated with muscle spasms in the left shoulder area. When she tries to move the left arm, she feels that something catches in the left shoulder. She states that she had mowed grass yesterday however she was on a sitting lawnmower. She has a cough, no shortness of breath, nausea, vomiting, dizziness, diaphoresis or palpitation. No leg swelling. No fever. No abdominal pain. Vital signs notable for heart rate 121 in the ER, currently 58, labs showed normal CBC, chemistry and troponin. Chest x-ray shows right lower lobe density, ? Pleural thickening. She reports having cardiac catheterization in the 30s and 2 prior stress test over 10 years ago. States that she has had an abnormal cardiac work-up however she does not know which of the tests were abnormal showing some scarring. EKG shows sinus rhythm rate of 77 with left bundle branch block, ST segment seems elevated in V1 through V4. She is being admitted for further management. Past Medical History:          Diagnosis Date    Cerebral palsy ([facility] [address]     Hypertension        Past Surgical History:      History reviewed. No pertinent surgical history. Medications Prior to Admission:      Prior to Admission medications    Medication Sig Start Date End Date Taking?  Authorizing Provider   lisinopril (PRINIVIL;ZESTRIL) 20 MG tablet Take 20 gallops. Abdomen: Soft, non-tender, non-distended with normal bowel sounds. Musculoskeletal:  No clubbing, cyanosis or edema bilaterally. Limited range of motion left upper extremity due to pain at the shoulder. No reproducible chest wall tenderness. Skin: Skin color, texture, turgor normal.  No rashes or lesions. Neurologic: Mild dysarthria. Cranial nerves: II-XII intact, grossly non-focal.  Psychiatric:  Alert and oriented, thought content appropriate, normal insight  Capillary Refill: Brisk,< 3 seconds   Peripheral Pulses: +2 palpable, equal bilaterally       Labs:     Recent Labs     04/11/21  1745   WBC 8.5   HGB 13.9   HCT 40.6        Recent Labs     04/11/21  1837      K 3.6      CO2 25   BUN 16   CREATININE 0.7   CALCIUM 8.9     Recent Labs     04/11/21  1837   AST 16   ALT 11   BILITOT <0.2   ALKPHOS 69     No results for input(s): INR in the last 72 hours. Recent Labs     04/11/21  1837   TROPONINI <0.01       Urinalysis:      Lab Results   Component Value Date    NITRU NEGATIVE 03/01/2013    WBCUA 1-3 03/01/2013    BACTERIA FEW 03/01/2013    RBCUA NONE 03/01/2013    BLOODU NEGATIVE 03/01/2013    SPECGRAV <=1.005 03/01/2013    GLUCOSEU NEGATIVE 03/01/2013       Radiology:   Reviewed and documented  XR CHEST PORTABLE   Final Result   1. There is a poorly defined asymmetrical density projecting over the right   lower lung that could represent pleural thickening. 2.  No signs of an acute cardiopulmonary process otherwise         NM Cardiac Stress Test Nuclear Imaging    (Results Pending)       ASSESSMENT:    Active Hospital Problems    Diagnosis Date Noted    Chest pain [R07.9] 04/11/2021    LBBB (left bundle branch block) [I44.7] 04/11/2021    Cerebral palsy ([facility] [address] [G80.9] 04/11/2021    HTN (hypertension) [I10] 04/11/2021    Anxiety [F41.9] 04/11/2021     . Left shoulder impingment    PLAN:  1.   Chest pain, patient's history sounds atypical and musculoskeletal in etiology however, she has an abnormal EKG with age-indeterminate left bundle branch block and family history of coronary artery disease. She is also a smoker. As a result of this, will plan for stress test and have cardiology see her. Cycle enzymes. Pain control. Aspirin and nitro as needed. 2.  Left bundle branch block, unknown chronicity. 3.  Left shoulder pain with impingement, pain control, physical therapy as tolerated and muscle relaxant. 4.  History of cerebral palsy at baseline. 5.  Hypertension, controlled, monitor blood pressure  6. Anxiety currently stable  7. Abnormal CXR. Right lower lung density. This is not causing her current pain. Obtain CT chest as needed. Has no other respiratory symptoms. DVT Prophylaxis:lovenox  Diet: Diet NPO, After Midnight  DIET CARDIAC; No Caffeine  Code Status: Full Code    PT/OT Eval Status: eval and treat    Dispo - obs/tele       Laury Fine MD    Thank you Pelon Benito MD for the opportunity to be involved in this patient's care.

## 2021-04-12 NOTE — PROGRESS NOTES
Hospitalist Progress Note      SYNOPSIS: Patient admitted on 2021     54 y.o. female who presented to [facility] with past medical history of hypertension, anxiety, and cerebral palsy. Patient started having pain in the left upper chest this morning, she thought she had pulled a muscle, pain is worse with deep breathing and movement of the left arm, described as achy, mild to moderate in intensity, radiated towards the left shoulder, associated with muscle spasms in the left shoulder area. When she tries to move the left arm, she feels that something catches in the left shoulder. She states that she had mowed grass yesterday however she was on a sitting lawnmower. She has a cough, no shortness of breath, nausea, vomiting, dizziness, diaphoresis or palpitation. No leg swelling. No fever. No abdominal pain.     Vital signs notable for heart rate 121 in the ER, currently 58, labs showed normal CBC, chemistry and troponin. Chest x-ray shows right lower lobe density, ? Pleural thickening. She reports having cardiac catheterization in the 30s and 2 prior stress test over 10 years ago. States that she has had an abnormal cardiac work-up however she does not know which of the tests were abnormal showing some scarring. EKG shows sinus rhythm rate of 77 with left bundle branch block, ST segment seems elevated in V1 through V4. SUBJECTIVE:    Patient seen and examined  Records reviewed. Stable overnight. No other overnight issues reported.    Temp (24hrs), Av.1 °F (36.7 °C), Min:97 °F (36.1 °C), Max:99 °F (37.2 °C)    DIET: Diet NPO, After Midnight  CODE: Full Code    Intake/Output Summary (Last 24 hours) at 2021 1028  Last data filed at 2021 0500  Gross per 24 hour   Intake 1060 ml   Output 350 ml   Net 710 ml       OBJECTIVE:    BP (!) 147/68   Pulse 63   Temp 99 °F (37.2 °C) (Temporal)   Resp 16   Ht 5' 8\" (1.727 m)   Wt 183 lb 6.4 oz (83.2 kg)   SpO2 95%   BMI 27.89 kg/m² General appearance: No apparent distress, appears stated age and cooperative. HEENT:  Conjunctivae/corneas clear. Neck: Supple. No jugular venous distention. Respiratory: Clear to auscultation bilaterally, normal respiratory effort  Cardiovascular: Regular rate rhythm, normal S1-S2  Abdomen: Soft, nontender, nondistended  Musculoskeletal: No clubbing, cyanosis, no bilateral lower extremity edema. Brisk capillary refill. Skin:  No rashes  on visible skin  Neurologic: awake, alert and following commands     ASSESSMENT:  -Atypical chest pain  -Left bundle branch block  -Hypertension  -Cerebral palsy       PLAN:  -Cardiology consulted  -Coronary CTA pending  -Continue home medications      DISPOSITION:     Medications:  REVIEWED DAILY    Infusion Medications    sodium chloride       Scheduled Medications    lisinopril  20 mg Oral Daily    PARoxetine  20 mg Oral QAM    sodium chloride flush  5-40 mL Intravenous 2 times per day    aspirin  81 mg Oral Daily    heparin (porcine)  5,000 Units Subcutaneous Q8H     PRN Meds: morphine, HYDROcodone 5 mg - acetaminophen, cyclobenzaprine, metoprolol, dilTIAZem, nitroGLYCERIN, clonazePAM, sodium chloride flush, sodium chloride, promethazine **OR** ondansetron, acetaminophen **OR** acetaminophen, polyethylene glycol, regadenoson    Labs:     Recent Labs     04/11/21  1745 04/12/21  0424   WBC 8.5 7.5   HGB 13.9 13.0   HCT 40.6 38.2    211       Recent Labs     04/11/21  1837 04/12/21  0424    140   K 3.6 3.4*    107   CO2 25 24   BUN 16 11   CREATININE 0.7 0.7   CALCIUM 8.9 8.7       Recent Labs     04/11/21  1837   PROT 5.7*   ALKPHOS 69   ALT 11   AST 16   BILITOT <0.2       No results for input(s): INR in the last 72 hours.     Recent Labs     04/11/21  1837 04/12/21  0135 04/12/21  0424   TROPONINI <0.01 <0.01 <0.01       Chronic labs:    Lab Results   Component Value Date    CHOL 146 04/12/2021    TRIG 105 04/12/2021    HDL 56 04/12/2021 LDLCALC 69 04/12/2021    TSH 1.320 04/11/2021       Radiology: REVIEWED DAILY    +++++++++++++++++++++++++++++++++++++++++++++++++  [address], [place]  +++++++++++++++++++++++++++++++++++++++++++++++++  NOTE: This report was transcribed using voice recognition software. Every effort was made to ensure accuracy; however, inadvertent computerized transcription errors may be present.

## 2021-04-13 ENCOUNTER — APPOINTMENT (OUTPATIENT)
Dept: CT IMAGING | Age: 56
End: 2021-04-13
Payer: MEDICARE

## 2021-04-13 PROCEDURE — 75574 CT ANGIO HRT W/3D IMAGE: CPT

## 2021-04-13 PROCEDURE — 96374 THER/PROPH/DIAG INJ IV PUSH: CPT

## 2021-04-13 PROCEDURE — 6370000000 HC RX 637 (ALT 250 FOR IP): Performed by: STUDENT IN AN ORGANIZED HEALTH CARE EDUCATION/TRAINING PROGRAM

## 2021-04-13 PROCEDURE — 97161 PT EVAL LOW COMPLEX 20 MIN: CPT

## 2021-04-13 PROCEDURE — 2580000003 HC RX 258: Performed by: RADIOLOGY

## 2021-04-13 PROCEDURE — 6360000004 HC RX CONTRAST MEDICATION: Performed by: RADIOLOGY

## 2021-04-13 PROCEDURE — 6370000000 HC RX 637 (ALT 250 FOR IP): Performed by: FAMILY MEDICINE

## 2021-04-13 PROCEDURE — 2580000003 HC RX 258: Performed by: FAMILY MEDICINE

## 2021-04-13 PROCEDURE — APPSS60 APP SPLIT SHARED TIME 46-60 MINUTES: Performed by: PHYSICIAN ASSISTANT

## 2021-04-13 PROCEDURE — 97530 THERAPEUTIC ACTIVITIES: CPT

## 2021-04-13 PROCEDURE — 2580000003 HC RX 258: Performed by: INTERNAL MEDICINE

## 2021-04-13 PROCEDURE — 6360000002 HC RX W HCPCS: Performed by: PHYSICIAN ASSISTANT

## 2021-04-13 PROCEDURE — G0378 HOSPITAL OBSERVATION PER HR: HCPCS

## 2021-04-13 PROCEDURE — 75574 CT ANGIO HRT W/3D IMAGE: CPT | Performed by: INTERNAL MEDICINE

## 2021-04-13 PROCEDURE — 2580000003 HC RX 258: Performed by: PHYSICIAN ASSISTANT

## 2021-04-13 PROCEDURE — 6370000000 HC RX 637 (ALT 250 FOR IP): Performed by: INTERNAL MEDICINE

## 2021-04-13 RX ORDER — NITROGLYCERIN 0.4 MG/1
0.8 TABLET SUBLINGUAL ONCE
Status: COMPLETED | OUTPATIENT
Start: 2021-04-13 | End: 2021-04-13

## 2021-04-13 RX ORDER — POTASSIUM CHLORIDE 20 MEQ/1
20 TABLET, EXTENDED RELEASE ORAL 2 TIMES DAILY WITH MEALS
Status: COMPLETED | OUTPATIENT
Start: 2021-04-13 | End: 2021-04-13

## 2021-04-13 RX ORDER — SODIUM CHLORIDE 0.9 % (FLUSH) 0.9 %
10 SYRINGE (ML) INJECTION ONCE
Status: COMPLETED | OUTPATIENT
Start: 2021-04-13 | End: 2021-04-13

## 2021-04-13 RX ORDER — SODIUM CHLORIDE 9 MG/ML
INJECTION, SOLUTION INTRAVENOUS ONCE
Status: COMPLETED | OUTPATIENT
Start: 2021-04-13 | End: 2021-04-13

## 2021-04-13 RX ADMIN — SODIUM CHLORIDE, PRESERVATIVE FREE 10 ML: 5 INJECTION INTRAVENOUS at 08:34

## 2021-04-13 RX ADMIN — NITROGLYCERIN 0.8 MG: 0.4 TABLET, ORALLY DISINTEGRATING SUBLINGUAL at 11:08

## 2021-04-13 RX ADMIN — SODIUM CHLORIDE, PRESERVATIVE FREE 10 ML: 5 INJECTION INTRAVENOUS at 10:58

## 2021-04-13 RX ADMIN — HYDROCODONE BITARTRATE AND ACETAMINOPHEN 1 TABLET: 5; 325 TABLET ORAL at 04:36

## 2021-04-13 RX ADMIN — PAROXETINE 20 MG: 20 TABLET, FILM COATED ORAL at 08:34

## 2021-04-13 RX ADMIN — LISINOPRIL 20 MG: 10 TABLET ORAL at 08:33

## 2021-04-13 RX ADMIN — ASPIRIN 81 MG CHEWABLE TABLET 81 MG: 81 TABLET CHEWABLE at 08:33

## 2021-04-13 RX ADMIN — POTASSIUM CHLORIDE 20 MEQ: 1500 TABLET, EXTENDED RELEASE ORAL at 17:24

## 2021-04-13 RX ADMIN — HEPARIN 100 UNITS: 100 SYRINGE at 22:00

## 2021-04-13 RX ADMIN — SODIUM CHLORIDE: 9 INJECTION, SOLUTION INTRAVENOUS at 08:52

## 2021-04-13 RX ADMIN — SODIUM CHLORIDE, PRESERVATIVE FREE 10 ML: 5 INJECTION INTRAVENOUS at 22:00

## 2021-04-13 RX ADMIN — HYDROCODONE BITARTRATE AND ACETAMINOPHEN 1 TABLET: 5; 325 TABLET ORAL at 17:13

## 2021-04-13 RX ADMIN — IOPAMIDOL 120 ML: 755 INJECTION, SOLUTION INTRAVENOUS at 10:58

## 2021-04-13 RX ADMIN — SODIUM CHLORIDE, PRESERVATIVE FREE 10 ML: 5 INJECTION INTRAVENOUS at 08:35

## 2021-04-13 RX ADMIN — HYDROCODONE BITARTRATE AND ACETAMINOPHEN 1 TABLET: 5; 325 TABLET ORAL at 10:43

## 2021-04-13 RX ADMIN — POTASSIUM CHLORIDE 20 MEQ: 1500 TABLET, EXTENDED RELEASE ORAL at 08:33

## 2021-04-13 ASSESSMENT — PAIN SCALES - GENERAL
PAINLEVEL_OUTOF10: 8
PAINLEVEL_OUTOF10: 0
PAINLEVEL_OUTOF10: 7
PAINLEVEL_OUTOF10: 0
PAINLEVEL_OUTOF10: 7

## 2021-04-13 ASSESSMENT — PAIN DESCRIPTION - LOCATION: LOCATION: ARM

## 2021-04-13 NOTE — PROGRESS NOTES
Inpatient Cardiology Progress note     PATIENT IS BEING FOLLOWED FOR: atypical chest pain, abnormal coronary CTA     Rodrick Yuan is a 54 y.o. female seen in initial consultation by Dr José Mackey 4/12/2021      SUBJECTIVE: denies chest pain or shortness of breath   OBJECTIVE: No apparent distress, resting in bed flat     ROS:  Consist: Denies fevers, chills or night sweats  Heart: Denies chest pain, palpitations, lightheadedness, dizziness or syncope  Lungs: Denies SOB, cough, wheezing, orthopnea or PND  GI: Denies abdominal pain, vomiting or diarrhea    PHYSICAL EXAM:   BP (!) 141/68   Pulse 58   Temp 98.3 °F (36.8 °C) (Temporal)   Resp 16   Ht 5' 8\" (1.727 m)   Wt 171 lb (77.6 kg)   SpO2 97%   BMI 26.00 kg/m²      CONST:  Well developed,  female who appears her stated age. Awake, alert, cooperative, no apparent distress  HEENT:   Head- Normocephalic, atraumatic   Eyes- Conjunctivae pink, anicteric  Throat- Oral mucosa pink and moist  Neck-  No stridor, trachea midline, no jugular venous distention. CHEST: Chest symmetrical and non-tender to palpation. RESPIRATORY: Lung sounds clear throughout fields bilaterally. No wheeze or rhonchi noted. CARDIOVASCULAR:     No carotid bruit noted bilaterally   Heart Ausculation- Regular rate and rhythm, no murmur. PV: No lower extremity edema. No varicosities. ABDOMEN: Soft, non-tender to light palpation. Bowel sounds present. MS: Good muscle strength and tone. No atrophy or abnormal movements. : Deferred  SKIN: Warm and dry no statis dermatitis or ulcers   NEURO / PSYCH: Oriented to person, place and time. Speech clear and appropriate. Follows all commands.  Pleasant affect       Intake/Output Summary (Last 24 hours) at 4/13/2021 1506  Last data filed at 4/13/2021 1417  Gross per 24 hour   Intake 1960 ml   Output 400 ml   Net 1560 ml       Weight:   Wt Readings from Last 3 Encounters:   04/13/21 171 lb (77.6 kg)   02/06/20 165 lb (74.8 kg)     Current Inpatient Medications:   potassium chloride  20 mEq Oral BID WC    sodium chloride flush  10 mL Intravenous Once    lidocaine  5 mL Intradermal Once    sodium chloride flush  5-40 mL Intravenous 2 times per day    heparin flush  1 mL Intravenous 2 times per day    lisinopril  20 mg Oral Daily    PARoxetine  20 mg Oral QAM    sodium chloride flush  5-40 mL Intravenous 2 times per day    aspirin  81 mg Oral Daily    heparin (porcine)  5,000 Units Subcutaneous Q8H       IV Infusions (if any):   sodium chloride      sodium chloride         DIAGNOSTIC/ LABORATORY DATA:  Labs:   CBC:   Recent Labs     04/11/21  1745 04/12/21  0424   WBC 8.5 7.5   HGB 13.9 13.0   HCT 40.6 38.2    211     BMP:   Recent Labs     04/11/21  1837 04/12/21  0424    140   K 3.6 3.4*   CO2 25 24   BUN 16 11   CREATININE 0.7 0.7   LABGLOM >60 >60   CALCIUM 8.9 8.7     Mag:   Recent Labs     04/12/21  0424   MG 1.8     TSH:   Recent Labs     04/11/21  1837   TSH 1.320     CARDIAC ENZYMES:  Recent Labs     04/11/21  1837 04/12/21  0135 04/12/21  0424   TROPONINI <0.01 <0.01 <0.01     FASTING LIPID PANEL:  Lab Results   Component Value Date    CHOL 146 04/12/2021    HDL 56 04/12/2021    LDLCALC 69 04/12/2021    TRIG 105 04/12/2021     LIVER PROFILE:  Recent Labs     04/11/21  1837   AST 16   ALT 11   LABALBU 3.8         Telemetry: SR       Coronary CTA 4/13/2021 report pending     Assessment  1. Atypical chest pain-- coronary CTA today with soft plaque LAD territory (full report pending)   2. Hypertension  3. Newly identified left bundle branch block  4. Cerebral palsy -fairly functional  5. Smoking  6. Overweight (BMI 27.89 kg/square meter)  7. Family history of premature coronary disease    Plan:  1. Left heart catheterization +/- PCI tomorrow   2.  NPO after midnight     Above discussed with Dr Meg Goddard   Electronically signed by Davdi Malone on 4/13/2021 at 3:06 PM    PHYSICIAN ADDENDUM:  I independently interviewed and examined the patient. I have reviewed the above documentation completed by the MARIA EUGENIA. Please see my additional contributions to the HPI, physical exam, and assessment / medical decision making. I independently reviewed the HPI, ROS, PMH, PSH, [address], SH, and medications independently and agree with above documentation.     I discussed the assessment and plan with the patient/family at the bedside as well as the bedside nurse and the primary team.    Leatha Coto MD, [facility]  Interventional Cardiology/Structural Heart Disease  Office: [phone]

## 2021-04-13 NOTE — PROGRESS NOTES
Hospitalist Progress Note      SYNOPSIS: Patient admitted on 2021 for chest pain      SUBJECTIVE:    Patient seen and examined at bedside in NAD. Pt unable to complete CTA coronary 2/2 infiltrated line. Scan to be re-attempted. Pt currently denies any chest pain, palpitations or SOB. Records reviewed. Stable overnight. No other overnight issues reported. Temp (24hrs), Av.7 °F (36.5 °C), Min:96.2 °F (35.7 °C), Max:98.3 °F (36.8 °C)    DIET: DIET CARDIAC; No Caffeine  CODE: Full Code    Intake/Output Summary (Last 24 hours) at 2021 1240  Last data filed at 2021 1020  Gross per 24 hour   Intake 2480 ml   Output 400 ml   Net 2080 ml       OBJECTIVE:    BP (!) 141/68   Pulse 58   Temp 98.3 °F (36.8 °C) (Temporal)   Resp 16   Ht 5' 8\" (1.727 m)   Wt 171 lb (77.6 kg)   SpO2 97%   BMI 26.00 kg/m²     General appearance: No apparent distress, appears stated age and cooperative. HEENT:  Conjunctivae/corneas clear. Neck: Supple. No jugular venous distention. Respiratory: Clear to auscultation bilaterally, normal respiratory effort  Cardiovascular: Regular rate rhythm, normal S1-S2  Abdomen: Soft, nontender, nondistended  Musculoskeletal: No clubbing, cyanosis, no bilateral lower extremity edema. Brisk capillary refill.    Skin:  No rashes  on visible skin  Neurologic: awake, alert and following commands     ASSESSMENT:  -Atypical chest pain  -Left bundle branch block  -Hypertension  -Cerebral palsy        PLAN:  -Cardiology consulted  -Coronary CTA pending  -Continue home medications  - Currently denies any chest pain or palpitations     DISPOSITION: Obs Intermediate    Medications:  REVIEWED DAILY    Infusion Medications    sodium chloride      sodium chloride       Scheduled Medications    potassium chloride  20 mEq Oral BID WC    sodium chloride flush  10 mL Intravenous Once    lidocaine  5 mL Intradermal Once    sodium chloride flush  5-40 mL Intravenous 2 times per day    heparin flush  1 mL Intravenous 2 times per day    lisinopril  20 mg Oral Daily    PARoxetine  20 mg Oral QAM    sodium chloride flush  5-40 mL Intravenous 2 times per day    aspirin  81 mg Oral Daily    heparin (porcine)  5,000 Units Subcutaneous Q8H     PRN Meds: morphine, HYDROcodone 5 mg - acetaminophen, cyclobenzaprine, nitroGLYCERIN, metoprolol, sodium chloride flush, sodium chloride, heparin flush, clonazePAM, sodium chloride flush, sodium chloride, promethazine **OR** ondansetron, acetaminophen **OR** acetaminophen, polyethylene glycol, regadenoson    Labs:     Recent Labs     04/11/21  1745 04/12/21  0424   WBC 8.5 7.5   HGB 13.9 13.0   HCT 40.6 38.2    211       Recent Labs     04/11/21  1837 04/12/21  0424    140   K 3.6 3.4*    107   CO2 25 24   BUN 16 11   CREATININE 0.7 0.7   CALCIUM 8.9 8.7       Recent Labs     04/11/21 1837   PROT 5.7*   ALKPHOS 69   ALT 11   AST 16   BILITOT <0.2       No results for input(s): INR in the last 72 hours. Recent Labs     04/11/21  1837 04/12/21  0135 04/12/21  0424   TROPONINI <0.01 <0.01 <0.01       Chronic labs:    Lab Results   Component Value Date    CHOL 146 04/12/2021    TRIG 105 04/12/2021    HDL 56 04/12/2021    LDLCALC 69 04/12/2021    TSH 1.320 04/11/2021       Radiology: REVIEWED DAILY    +++++++++++++++++++++++++++++++++++++++++++++++++  Jesenia Castillo Physician - [address] [facility], [place]  +++++++++++++++++++++++++++++++++++++++++++++++++  NOTE: This report was transcribed using voice recognition software. Every effort was made to ensure accuracy; however, inadvertent computerized transcription errors may be present.

## 2021-04-13 NOTE — CARE COORDINATION
4/13, Discharge plan remains home once medically cleared for discharge. Contact made with PT therapy-vm left on PT order active from 4/11. Awaiting to see PT eval for further recommendations for patient. DME owned is none. Transportation for patient to be sister. SW to follow for further discharge planning needs.       Ava Chawla NAT  [facility]IC[facility] Case Management  [phone]

## 2021-04-13 NOTE — PATIENT CARE CONFERENCE
[facility] Quality Flow/Interdisciplinary Rounds Progress Note        Quality Flow Rounds held on April 13, 2021    Disciplines Attending:  Bedside Nurse, ,  and Nursing Unit Leadership    Bg Astorga was admitted on 4/11/2021  5:26 PM    Anticipated Discharge Date:  Expected Discharge Date: 04/12/21    Disposition:    Sergio Score:  Sergio Scale Score: 22    Readmission Risk              Risk of Unplanned Readmission:        0           Discussed patient goal for the day, patient clinical progression, and barriers to discharge.   The following Goal(s) of the Day/Commitment(s) have been identified:  diagnostics report results      Ian Buchanan  April 13, 2021

## 2021-04-13 NOTE — PROGRESS NOTES
Physical Therapy  Physical Therapy Initial Assessment     Name: Dwayne Calderón  : 1965  MRN: [id number]    Referring Provider:  Wendy Gomez MD    Date of Service: 2021    Evaluating PT:  Yoel Grant PT, DPT PT [id number]    Room #:  2939/5514-B  Diagnosis:  Chest Pain  PMHx/PSHx:  HTN, Anxiety, Cerebral Palsy  Procedure/Surgery:    Precautions:  Falls, Cerebral Palsy (LUE weakness)  Equipment Needs:      SUBJECTIVE:    Pt lives with boyfriend in a 2 story home with 3 stairs to enter and single rail. Bed is on first floor and bath is on first floor. Pt has flight of stairs to 2nd floor. Pt ambulated with no device independently PTA. Equipment Owned:    OBJECTIVE:   Initial Evaluation  Date: 21 Treatment Short Term/ Long Term   Goals   AM-PAC 6 Clicks      Was pt agreeable to Eval/treatment? yes     Does pt have pain? Mild chest pain     Bed Mobility  Rolling: Independent  Supine to sit:  Independent  Sit to supine: Independent  Scooting: Independent    Independent     Transfers Sit to stand: SBA  Stand to sit: SBA  Stand pivot: SBA  Sit to stand: Modified Independent  Stand to sit: Modified Independent  Stand pivot: Modified Independent     Ambulation    150,150 feet with SBA no AD  >150 feet with Modified Independent  AAD   Stair negotiation: ascended and descended  4 steps with single rail SBA  >4 steps with single rail Modified Independent     ROM BUE:  See OT Note  BLE:  WFL     Strength BUE:  See OT Note  BLE:  4/5  Improve Strength 1/3 MMT Grade   Balance Sitting EOB:  SBA  Dynamic Standing:  SBA no AD  Sitting EOB:  Independent    Dynamic Standing:  Modified Independent       Pt is A & O x 4  Sensation:  Pt denies numbness and tingling to extremities  Edema:  WNL      Patient education  Pt educated on role of PT    Patient response to education:   Pt verbalized understanding Pt demonstrated skill Pt requires further education in this area   x x x     ASSESSMENT:    Comments:  Pt received in supine agreeable to PT evaluation. Pt performing bed mobility, functional transfers, ambulation, and stair negotiation without physical assist. Pt with no gross LOB. Patient would benefit from continued skilled PT to maximize functional mobility independence. Treatment:  Patient practiced and was instructed in the following treatment:     Bed mobility- verbal cues to facilitate independence   Functional transfers-Verbal cues for proper positioning and sequencing to perform transfers safely with maximum independence.  Gait training-Verbal cues for proper positioning and sequencing  to maximize functional mobility independence.  Stair negotiation - verbal cues to facilitate independence      Pt's/ family goals   1. Get better    Patient and or family understand(s) diagnosis, prognosis, and plan of care. yes    PLAN:      PLAN OF CARE:    Current Treatment Recommendations     [x] Strengthening     [x] ROM   [x] Balance Training   [x] Endurance Training   [x] Transfer Training   [x] Gait Training   [x] Stair Training   [x] Positioning   [x] Safety and Education Training   [x] Patient/Caregiver Education   [] HEP  [] Other       PT care will be provided in accordance with the objectives noted above. Exercises and functional mobility practice will be used as well as appropriate assistive devices or modalities to obtain goals. Patient and family education will also be administered as needed. Frequency of treatments: 2-5x/week x 1-2 weeks. Time in  1215  Time out  1235    Total Treatment Time  8 minutes     Evaluation Time includes thorough review of current medical information, gathering information on past medical history/social history and prior level of function, completion of standardized testing/informal observation of tasks, assessment of data and education on plan of care and goals.      CPT codes:  [] Low Complexity PT evaluation 03161  [x] Moderate Complexity PT evaluation 82788  [] High Complexity PT evaluation [id number]  [] PT Re-evaluation [id number]  [] Gait training 26967 0 minutes  [] Manual therapy 79774 0 minutes  [x] Therapeutic activities 78422 8 minutes  [] Therapeutic exercises 93387 0 minutes  [] Neuromuscular reeducation 88738 0 minutes       Cherelle Larkin [facility] PT, DPT   [id number]

## 2021-04-14 VITALS
SYSTOLIC BLOOD PRESSURE: 130 MMHG | DIASTOLIC BLOOD PRESSURE: 72 MMHG | TEMPERATURE: 98 F | BODY MASS INDEX: 26.83 KG/M2 | HEART RATE: 60 BPM | WEIGHT: 177 LBS | RESPIRATION RATE: 16 BRPM | HEIGHT: 68 IN | OXYGEN SATURATION: 97 %

## 2021-04-14 LAB
ABO/RH: NORMAL
ALBUMIN SERPL-MCNC: 3.5 G/DL (ref 3.5–5.2)
ALP BLD-CCNC: 70 U/L (ref 35–104)
ALT SERPL-CCNC: 18 U/L (ref 0–32)
ANION GAP SERPL CALCULATED.3IONS-SCNC: 9 MMOL/L (ref 7–16)
ANTIBODY SCREEN: NORMAL
AST SERPL-CCNC: 23 U/L (ref 0–31)
BASOPHILS ABSOLUTE: 0.03 E9/L (ref 0–0.2)
BASOPHILS RELATIVE PERCENT: 0.4 % (ref 0–2)
BILIRUB SERPL-MCNC: 0.6 MG/DL (ref 0–1.2)
BUN BLDV-MCNC: 7 MG/DL (ref 6–20)
CALCIUM SERPL-MCNC: 8.7 MG/DL (ref 8.6–10.2)
CHLORIDE BLD-SCNC: 104 MMOL/L (ref 98–107)
CO2: 24 MMOL/L (ref 22–29)
CREAT SERPL-MCNC: 0.7 MG/DL (ref 0.5–1)
EOSINOPHILS ABSOLUTE: 0.17 E9/L (ref 0.05–0.5)
EOSINOPHILS RELATIVE PERCENT: 2.4 % (ref 0–6)
GFR AFRICAN AMERICAN: >60
GFR NON-AFRICAN AMERICAN: >60 ML/MIN/1.73
GLUCOSE BLD-MCNC: 109 MG/DL (ref 74–99)
HCT VFR BLD CALC: 41.1 % (ref 34–48)
HEMOGLOBIN: 13.4 G/DL (ref 11.5–15.5)
IMMATURE GRANULOCYTES #: 0.03 E9/L
IMMATURE GRANULOCYTES %: 0.4 % (ref 0–5)
LYMPHOCYTES ABSOLUTE: 1.36 E9/L (ref 1.5–4)
LYMPHOCYTES RELATIVE PERCENT: 19.3 % (ref 20–42)
MCH RBC QN AUTO: 32.7 PG (ref 26–35)
MCHC RBC AUTO-ENTMCNC: 32.6 % (ref 32–34.5)
MCV RBC AUTO: 100.2 FL (ref 80–99.9)
MONOCYTES ABSOLUTE: 0.56 E9/L (ref 0.1–0.95)
MONOCYTES RELATIVE PERCENT: 7.9 % (ref 2–12)
NEUTROPHILS ABSOLUTE: 4.9 E9/L (ref 1.8–7.3)
NEUTROPHILS RELATIVE PERCENT: 69.6 % (ref 43–80)
PDW BLD-RTO: 12.1 FL (ref 11.5–15)
PLATELET # BLD: 202 E9/L (ref 130–450)
PMV BLD AUTO: 9.8 FL (ref 7–12)
POTASSIUM REFLEX MAGNESIUM: 4.1 MMOL/L (ref 3.5–5)
RBC # BLD: 4.1 E12/L (ref 3.5–5.5)
SODIUM BLD-SCNC: 137 MMOL/L (ref 132–146)
TOTAL PROTEIN: 6 G/DL (ref 6.4–8.3)
WBC # BLD: 7.1 E9/L (ref 4.5–11.5)

## 2021-04-14 PROCEDURE — C1894 INTRO/SHEATH, NON-LASER: HCPCS

## 2021-04-14 PROCEDURE — 93458 L HRT ARTERY/VENTRICLE ANGIO: CPT | Performed by: INTERNAL MEDICINE

## 2021-04-14 PROCEDURE — C1769 GUIDE WIRE: HCPCS

## 2021-04-14 PROCEDURE — 80053 COMPREHEN METABOLIC PANEL: CPT

## 2021-04-14 PROCEDURE — 86900 BLOOD TYPING SEROLOGIC ABO: CPT

## 2021-04-14 PROCEDURE — 86901 BLOOD TYPING SEROLOGIC RH(D): CPT

## 2021-04-14 PROCEDURE — 6370000000 HC RX 637 (ALT 250 FOR IP): Performed by: FAMILY MEDICINE

## 2021-04-14 PROCEDURE — 6360000002 HC RX W HCPCS

## 2021-04-14 PROCEDURE — 2580000003 HC RX 258: Performed by: INTERNAL MEDICINE

## 2021-04-14 PROCEDURE — 2709999900 HC NON-CHARGEABLE SUPPLY

## 2021-04-14 PROCEDURE — 2500000003 HC RX 250 WO HCPCS

## 2021-04-14 PROCEDURE — C1887 CATHETER, GUIDING: HCPCS

## 2021-04-14 PROCEDURE — 86850 RBC ANTIBODY SCREEN: CPT

## 2021-04-14 PROCEDURE — 85025 COMPLETE CBC W/AUTO DIFF WBC: CPT

## 2021-04-14 PROCEDURE — 36415 COLL VENOUS BLD VENIPUNCTURE: CPT

## 2021-04-14 PROCEDURE — 93458 L HRT ARTERY/VENTRICLE ANGIO: CPT

## 2021-04-14 PROCEDURE — G0378 HOSPITAL OBSERVATION PER HR: HCPCS

## 2021-04-14 RX ORDER — SODIUM CHLORIDE 0.9 % (FLUSH) 0.9 %
5-40 SYRINGE (ML) INJECTION PRN
Status: DISCONTINUED | OUTPATIENT
Start: 2021-04-14 | End: 2021-04-14 | Stop reason: HOSPADM

## 2021-04-14 RX ORDER — SODIUM CHLORIDE 0.9 % (FLUSH) 0.9 %
5-40 SYRINGE (ML) INJECTION EVERY 12 HOURS SCHEDULED
Status: DISCONTINUED | OUTPATIENT
Start: 2021-04-14 | End: 2021-04-14 | Stop reason: HOSPADM

## 2021-04-14 RX ORDER — LISINOPRIL 20 MG/1
40 TABLET ORAL DAILY
Status: DISCONTINUED | OUTPATIENT
Start: 2021-04-15 | End: 2021-04-14 | Stop reason: HOSPADM

## 2021-04-14 RX ORDER — SODIUM CHLORIDE 9 MG/ML
INJECTION, SOLUTION INTRAVENOUS CONTINUOUS
Status: ACTIVE | OUTPATIENT
Start: 2021-04-14 | End: 2021-04-14

## 2021-04-14 RX ORDER — ASPIRIN 81 MG/1
81 TABLET, CHEWABLE ORAL DAILY
Qty: 30 TABLET | Refills: 3 | Status: SHIPPED | OUTPATIENT
Start: 2021-04-15

## 2021-04-14 RX ORDER — NITROGLYCERIN 0.4 MG/1
TABLET SUBLINGUAL
Qty: 25 TABLET | Refills: 3 | Status: SHIPPED | OUTPATIENT
Start: 2021-04-14

## 2021-04-14 RX ORDER — LISINOPRIL 40 MG/1
40 TABLET ORAL DAILY
Qty: 30 TABLET | Refills: 3 | Status: SHIPPED | OUTPATIENT
Start: 2021-04-15

## 2021-04-14 RX ORDER — ACETAMINOPHEN 325 MG/1
650 TABLET ORAL EVERY 4 HOURS PRN
Status: DISCONTINUED | OUTPATIENT
Start: 2021-04-14 | End: 2021-04-14 | Stop reason: HOSPADM

## 2021-04-14 RX ORDER — METOPROLOL SUCCINATE 25 MG/1
25 TABLET, EXTENDED RELEASE ORAL DAILY
Status: DISCONTINUED | OUTPATIENT
Start: 2021-04-14 | End: 2021-04-14

## 2021-04-14 RX ORDER — SODIUM CHLORIDE 9 MG/ML
25 INJECTION, SOLUTION INTRAVENOUS PRN
Status: DISCONTINUED | OUTPATIENT
Start: 2021-04-14 | End: 2021-04-14 | Stop reason: HOSPADM

## 2021-04-14 RX ORDER — ROSUVASTATIN CALCIUM 20 MG/1
20 TABLET, COATED ORAL NIGHTLY
Status: DISCONTINUED | OUTPATIENT
Start: 2021-04-14 | End: 2021-04-14 | Stop reason: HOSPADM

## 2021-04-14 RX ORDER — ROSUVASTATIN CALCIUM 20 MG/1
20 TABLET, COATED ORAL NIGHTLY
Qty: 30 TABLET | Refills: 3 | Status: SHIPPED | OUTPATIENT
Start: 2021-04-14

## 2021-04-14 RX ADMIN — HYDROCODONE BITARTRATE AND ACETAMINOPHEN 1 TABLET: 5; 325 TABLET ORAL at 00:24

## 2021-04-14 RX ADMIN — HYDROCODONE BITARTRATE AND ACETAMINOPHEN 1 TABLET: 5; 325 TABLET ORAL at 11:15

## 2021-04-14 RX ADMIN — PAROXETINE 20 MG: 20 TABLET, FILM COATED ORAL at 11:16

## 2021-04-14 RX ADMIN — LISINOPRIL 20 MG: 10 TABLET ORAL at 11:15

## 2021-04-14 RX ADMIN — SODIUM CHLORIDE: 9 INJECTION, SOLUTION INTRAVENOUS at 11:10

## 2021-04-14 RX ADMIN — SODIUM CHLORIDE, PRESERVATIVE FREE 10 ML: 5 INJECTION INTRAVENOUS at 11:17

## 2021-04-14 ASSESSMENT — PAIN DESCRIPTION - DESCRIPTORS: DESCRIPTORS: ACHING

## 2021-04-14 ASSESSMENT — PAIN SCALES - GENERAL: PAINLEVEL_OUTOF10: 7

## 2021-04-14 ASSESSMENT — PAIN DESCRIPTION - LOCATION: LOCATION: ARM

## 2021-04-14 ASSESSMENT — PAIN DESCRIPTION - ONSET: ONSET: ON-GOING

## 2021-04-14 ASSESSMENT — PAIN DESCRIPTION - PAIN TYPE: TYPE: ACUTE PAIN

## 2021-04-14 ASSESSMENT — PAIN DESCRIPTION - ORIENTATION: ORIENTATION: LEFT

## 2021-04-14 NOTE — PROCEDURES
[address]                  [address] [facility],  [facility]                            CARDIAC CATHETERIZATION    PATIENT NAME: Janna Toth                     :        1965  MED REC NO:   [id number]                            ROOM:       1115  ACCOUNT NO:   [de-identified]                           ADMIT DATE: 2021  PROVIDER:     Clara Lancaster MD    DATE OF PROCEDURE:  2021    LEFT HEART CATHETERIZATION    INDICATION:  Chest pain with 80% to 90% mid LAD stenosis on cardiac CTA. REFERRING PROVIDER:  Dr. Gary Mackey. PROCEDURE NOTE:  The patient was brought to the cardiac cath lab in her  usual fasting state. Under sterile condition and under local anesthetic  and using conscious sedation, a 6-Ugandan Terumo Slender sheath was  inserted into the right radial artery. The patient received 5000 units  of intravenous heparin. She also received 200 mcg of intraarterial  nitroglycerin and 2.5 mg of diluted verapamil via the right radial  sheath. Then, a 5-Ugandan TIG diagnostic catheter was advanced to the  ascending aorta without difficulty. The left main coronary artery was  engaged and a coronary angiogram was done. Then, the right coronary  artery was engaged and a coronary angiogram was done. This catheter was  exchanged over a guidewire to a 5-Ugandan pigtail which was advanced into  the left ventricle without difficulty. Left ventricular end-diastolic  pressure was measured. A left ventriculogram was done. There was no  significant gradient across the aortic valve. At the end of the  procedure, the pigtail was pulled out. The Terumo Slender sheath was  pulled out and a Vasc Band was applied over the right radial artery with  good hemostasis. The patient tolerated the procedure very well and no  complications were noted. No significant blood loss occurred during the  procedure. FINDINGS:  HEMODYNAMICS:  1.   Aortic pressure 136/82 mmHg.  2.  Left ventricular end-diastolic pressure 16 mmHg. CORONARY ANATOMY:  1. Left main:  It is a long and large artery with no angiographic  stenosis noted. 2.  LAD:  It is a large artery wrapping around the apex and it is  tortuous in its mid to distal segment. It gives rise to a very small  first diagonal branch, then to a large second diagonal branch. There  was 50% to 60% ostial LAD stenosis. There was 40% to 50% discrete mid  LAD stenosis at the takeoff of the second diagonal branch. 3.  Left circumflex: It is a large artery giving rise to a large  bifurcating tortuous OM-1 branch, then to a second obtuse marginal  branch. There was 30% ostial left circumflex stenosis. 4.  Right coronary artery: It is a large dominant artery giving rise to  a SA bryson or AV bryson branch, a few small right ventricular marginal  branches, a PDA and a PLV branch. There was 30% discrete mid RCA  stenosis. LEFT VENTRICULOGRAM:  Revealed an ejection fraction of 50%. No mitral  regurgitation was noted. IMPRESSION:  1.  50% to 60% ostial and 40% to 50% discrete mid LAD stenosis. 2.  30% discrete mid RCA stenosis. 3.  LVEDP 16 mmHg. 4.  Ejection fraction 50%. RECOMMENDATIONS:  Aggressive medical therapy. PROCEDURE START TIME:  09:02 a.m. PROCEDURE END TIME:  09:25 a.m. CONTRAST VOLUME:  58 mL of Optiray. FLUOROSCOPY TIME:  2.4 minutes. CONSCIOUS SEDATION:  1 mg of intravenous Versed and 50 mcg of  intravenous fentanyl.         Kevin Mayes MD    D: 04/14/2021 9:42:44       T: 04/14/2021 9:50:07     [email or web address]  Job#: [id number]     Doc#: [id number]    CC:

## 2021-04-14 NOTE — DISCHARGE SUMMARY
Hospitalist Discharge Summary    Patient ID: Bg Astorga   Patient : 1965  Patient's PCP: Lucie Olivier MD    Admit Date: 2021   Admitting Physician: Filemon Forbes MD    Discharge Date:  2021   Discharge Physician: Franco Lin MD   Discharge Condition: Stable  Discharge Disposition: [facility] course in brief:  (Please refer to daily progress notes for a comprehensive review of the hospitalization by requesting medical records)    HPI per Dr. Libby Riggs: 54 y.o. female who presented to [facility] with past medical history of hypertension, anxiety, and cerebral palsy. Patient started having pain in the left upper chest this morning, she thought she had pulled a muscle, pain is worse with deep breathing and movement of the left arm, described as achy, mild to moderate in intensity, radiated towards the left shoulder, associated with muscle spasms in the left shoulder area. When she tries to move the left arm, she feels that something catches in the left shoulder. She states that she had mowed grass yesterday however she was on a sitting lawnmower. She has a cough, no shortness of breath, nausea, vomiting, dizziness, diaphoresis or palpitation. No leg swelling. No fever. No abdominal pain.     Vital signs notable for heart rate 121 in the ER, currently 58, labs showed normal CBC, chemistry and troponin. Chest x-ray shows right lower lobe density, ? Pleural thickening. She reports having cardiac catheterization in the 30s and 2 prior stress test over 10 years ago. States that she has had an abnormal cardiac work-up however she does not know which of the tests were abnormal showing some scarring.   EKG shows sinus rhythm rate of 77 with left bundle branch block, ST segment seems elevated in V1 through V4.     ASSESSMENT:  -Atypical chest pain  -Left bundle branch block  -Hypertension  -Cerebral palsy        PLAN:  -Cardiology c/s noted and appreciated; pt to follow-up with Dr. Mesha Hood in 4-6 weeks  -Coronary CTA on 04/13/21 showed the LAD has 50% ostial stenosis and 80 to 90% stenosis in the midsegment with soft plaque. The left circumflex artery has small calcified plaque in the proximal segment with less than 10% stenosis. The right coronary artery has a small calcified plaque in the proximal segment with less than 10% stenosis. Mildly dilated left ventricle with mild systolic dysfunction, LVEF 51 %. Total Calcium score of 9.3 with minimal plaque burden. CAD-RADS: 4A.  Maximal stenosis 79 to 99%.  Interpretation, severe obstructive coronary disease.  -Pt subsequently underwent coronary angiogram on 04/14/21 which showed 50% to 60% ostial LAD stenosis and 40% to 50% discrete mid LAD stenosis. 30% discrete mid RCA stenosis. LVEDP 16 mmHg. Ejection fraction 50%. -Pt to continue ASA, Rosuvastatin 20mg PO QD, Lisinopril 40mg PO QD    Pt currently denies any chest pain, palpitations or SOB, Pt is hemodynamically stable for discharge.     Consults:   IP CONSULT TO HOSPITALIST  IP CONSULT TO CARDIOLOGY    Discharge Diagnoses:  Atypical Chest Pain  HTN  LBBB  Cerebral Palsy  Smoking  Overweight    Discharge Instructions / Follow up:    Future Appointments   Date Time Provider Selma Walker   4/14/2021  3:30 PM [facility]       Continued appropriate risk factor modification of blood pressure, diabetes and serum lipids will remain essential to reducing risk of future atherosclerotic development    Activity: activity as tolerated    Significant labs:  CBC:   Recent Labs     04/11/21  1745 04/12/21  0424 04/14/21  1005   WBC 8.5 7.5 7.1   RBC 4.14 3.92 4.10   HGB 13.9 13.0 13.4   HCT 40.6 38.2 41.1   MCV 98.1 97.4 100.2*   RDW 12.5 12.5 12.1    211 202     BMP:   Recent Labs     04/11/21  1837 04/12/21  0424 04/14/21  1005    140 137   K 3.6 3.4* 4.1    107 104   CO2 25 24 24   BUN 16 11 7   CREATININE 0.7 0.7 0.7   MG  --  1.8  --      LFT:  Recent Labs     04/11/21  1837 04/14/21  1005   PROT 5.7* 6.0*   ALKPHOS 69 70   ALT 11 18   AST 16 23   BILITOT <0.2 0.6     PT/INR: No results for input(s): INR, APTT in the last 72 hours. BNP: No results for input(s): BNP in the last 72 hours. Hgb A1C: No results found for: LABA1C  Folate and B12: No results found for: HKPBHIJH40, No results found for: FOLATE  Thyroid Studies:   Lab Results   Component Value Date    TSH 1.320 04/11/2021    U1TEREL 4.8 04/11/2021       Urinalysis:    Lab Results   Component Value Date    NITRU NEGATIVE 03/01/2013    WBCUA 1-3 03/01/2013    BACTERIA FEW 03/01/2013    RBCUA NONE 03/01/2013    BLOODU NEGATIVE 03/01/2013    SPECGRAV <=1.005 03/01/2013    GLUCOSEU NEGATIVE 03/01/2013       Imaging:  Xr Chest Portable    Result Date: 4/11/2021  EXAMINATION: ONE XRAY VIEW OF THE CHEST 4/11/2021 6:13 pm COMPARISON: 03/01/2013 HISTORY: ORDERING SYSTEM PROVIDED HISTORY: Shortness of breath TECHNOLOGIST PROVIDED HISTORY: Reason for exam:->Shortness of breath What reading provider will be dictating this exam?->CRC FINDINGS: There is asymmetrical density projecting over the right thorax. This is suggestive of atelectasis but underlying pleural or parenchymal scarring could give a similar appearance. The lungs appear clear. The contour of the mediastinum heart size are within the normal range. There are no signs of pneumothorax, pleural effusion or congestion. 1.  There is a poorly defined asymmetrical density projecting over the right lower lung that could represent pleural thickening.  2.  No signs of an acute cardiopulmonary process otherwise     Cta Coron Eject Fra Wall Motion    Result Date: 4/13/2021  EXAMINATION: CTA OF THE CORONARY ARTERIES WITH CALCIUM SCORE 4/13/2021 CTA CORON EJECT FRA WALL MOTION EXAM DATE: 4/13/21 Indications:  Chest pain TECHNIQUE: Dose modulation, iterative reconstruction, and/or weight based adjustment of the mA/kV was utilized to reduce the radiation dose less than 10% stenosis. Left atrium: Normal size left atrium without thrombus in left atrium left atrial appendage. Left ventricle: Mildly dilated left ventricle with mild LV systolic dysfunction, LVEF 51%. Pulmonary veins: There are 4 pulmonary veins, 2 on the left and 2 on the right joining the left atrium. Cardiac valves: Trileaflet aortic valve without stenosis. Mitral valve appears normal without flail segments or prolapse. Pericardium: The pericardium is normal in appearance without thickening or calcification. No pericardial effusion. Great vessels: The ascending aorta, aortic root, ascending thoracic aorta, main pulmonary artery, left and right pulmonary arteries are of normal caliber without dissection or aneurysms. NON-CARDIAC FINDINGS -all noncardiac findings will be reported separately. NON-CARDIAC FINDINGS: Airway is patent. No endobronchial lesions. The lungs are clear. Normal appearance of the thoracic aorta. Normal appearance of the pulmonary arterial system. Heart chambers are not enlarged. No pericardial effusion. Normal course and appearance of the esophagus. The imaged upper abdomen is unremarkable. Patient has undergone previous breast augmentation. Mild degenerative changes of the spine. 1. The LAD has 50% ostial stenosis and 80 to 90% stenosis in the midsegment with soft plaque. 2. The left circumflex artery has small calcified plaque in the proximal segment with less than 10% stenosis. 3. The right coronary artery has a small calcified plaque in the proximal segment with less than 10% stenosis 4. Mildly dilated left ventricle with mild systolic dysfunction, LVEF 51 %. 5. Total Calcium score of 9.3 with minimal plaque burden. CAD-RADS: 4A. Maximal stenosis 70 to 99%. Interpretation, severe obstructive coronary disease. Further cardiac investigation, consider functional testing or invasive coronary angiogram. Interpreted by: Diamond Jordan MD Signed by:  Diamond Jordan MD 4/13/21 Final result IMPRESSION FOR NONCARDIAC FINDINGS: No clinically significant noncardiac findings identified. Please see noncardiac section above. Discharge Medications:      Medication List      START taking these medications    aspirin 81 MG chewable tablet  Take 1 tablet by mouth daily  Start taking on: April 15, 2021     nitroGLYCERIN 0.4 MG SL tablet  Commonly known as: NITROSTAT  up to max of 3 total doses. If no relief after 1 dose, call 911. rosuvastatin 20 MG tablet  Commonly known as: CRESTOR  Take 1 tablet by mouth nightly        CHANGE how you take these medications    lisinopril 40 MG tablet  Commonly known as: PRINIVIL;ZESTRIL  Take 1 tablet by mouth daily  Start taking on: April 15, 2021  What changed:   · medication strength  · how much to take        CONTINUE taking these medications    clonazePAM 0.5 MG tablet  Commonly known as: KLONOPIN     COLLAGEN PO     Hair/Skin/Nails 1250-7.5-7.5 MCG-MG-UNT Chew  Generic drug: Biotin w/ Vitamins C & E     ibuprofen 200 MG tablet  Commonly known as: ADVIL;MOTRIN     PARoxetine 20 MG tablet  Commonly known as: PAXIL     PRENATAL VIT-DOCUSATE-IRON-FA PO           Where to Get Your Medications      These medications were sent to [facility] [facility] [address]    Phone: [phone]   · aspirin 81 MG chewable tablet  · lisinopril 40 MG tablet  · nitroGLYCERIN 0.4 MG SL tablet  · rosuvastatin 20 MG tablet         Time Spent on discharge is more than 45 minutes in the examination, evaluation, counseling and review of medications and discharge plan.    +++++++++++++++++++++++++++++++++++++++++++++++++  MD NALINI Veronica/ Hal Leong 19, [facility]  +++++++++++++++++++++++++++++++++++++++++++++++++  NOTE: This report was transcribed using voice recognition software.  Every effort was made to ensure accuracy; however, inadvertent computerized transcription errors may be present.

## 2021-04-14 NOTE — PROGRESS NOTES
CLINICAL PHARMACY NOTE: MEDS TO [address] Select Patient?: No  Total # of Prescriptions Filled: 3   The following medications were delivered to the patient:  · Rosuvastatin 20  · Lisinopril 40  · Nitroglycerin 0.4  Total # of Interventions Completed: 3  Time Spent (min): 30    Additional Documentation:    Otc aspirin 81 chewable

## 2021-04-14 NOTE — PROGRESS NOTES
kg)     Current Inpatient Medications:   sodium chloride flush  5-40 mL Intravenous 2 times per day    sodium chloride flush  10 mL Intravenous Once    lidocaine  5 mL Intradermal Once    heparin flush  1 mL Intravenous 2 times per day    lisinopril  20 mg Oral Daily    PARoxetine  20 mg Oral QAM    aspirin  81 mg Oral Daily    heparin (porcine)  5,000 Units Subcutaneous Q8H       IV Infusions (if any):   sodium chloride      sodium chloride 75 mL/hr at 04/14/21 1110       DIAGNOSTIC/ LABORATORY DATA:  Labs:   CBC:   Recent Labs     04/12/21  0424 04/14/21  1005   WBC 7.5 7.1   HGB 13.0 13.4   HCT 38.2 41.1    202     BMP:   Recent Labs     04/12/21  0424 04/14/21  1005    137   K 3.4* 4.1   CO2 24 24   BUN 11 7   CREATININE 0.7 0.7   LABGLOM >60 >60   CALCIUM 8.7 8.7     Mag:   Recent Labs     04/12/21  0424   MG 1.8     TSH:   Recent Labs     04/11/21  1837   TSH 1.320     CARDIAC ENZYMES:  Recent Labs     04/11/21  1837 04/12/21  0135 04/12/21  0424   TROPONINI <0.01 <0.01 <0.01     FASTING LIPID PANEL:  Lab Results   Component Value Date    CHOL 146 04/12/2021    HDL 56 04/12/2021    LDLCALC 69 04/12/2021    TRIG 105 04/12/2021     LIVER PROFILE:  Recent Labs     04/11/21  1837 04/14/21  1005   AST 16 23   ALT 11 18   LABALBU 3.8 3.5         Telemetry: SR       Coronary CTA 4/13/2021: 50% ostial and 80% mid LAD with soft plaque. Normal LVEF    Coronary angiogram 4/14/21 (Dr. Will Sweeney): 1.  50% to 60% ostial LAD stenosis and 40% to 50% discrete mid LAD  stenosis. 2.  30% discrete mid RCA stenosis. 3.  LVEDP 16 mmHg. 4.  Ejection fraction 50%. Assessment  1. Atypical chest pain-- moderate LAD disease by coronary angiogram today (reviewed by RK). LVEF low normal by CTA. 2. Hypertension  3. Newly identified left bundle branch block  4. Cerebral palsy -fairly functional  5. Smoking  6. Overweight (BMI 27.89 kg/square meter)  7.  Family history of premature coronary disease    Plan:  1. Continue aspirin  2. Increase lisinopril to 40 mg daily  3. Start rosuvastatin 20 mg daily   4. Ok for discharge from cardiac standpoint  5. F/u with Dr. Bora Nolan in 4-8 weeks    Above discussed with Dr Bora Nolan   Electronically signed by Gracy Teran PA-C on 4/14/2021 at 1:07 PM    PHYSICIAN ADDENDUM:  I independently interviewed and examined the patient. I have reviewed the above documentation completed by the MARIA EUGENIA. Please see my additional contributions to the HPI, physical exam, and assessment / medical decision making. I independently reviewed the HPI, ROS, PMH, PSH, [address], SH, and medications independently and agree with above documentation.     I discussed the assessment and plan with the patient/family at the bedside as well as the bedside nurse and the primary team.    Ina Hutton MD, [facility]  Interventional Cardiology/Structural Heart Disease  Office: [phone]

## 2021-04-14 NOTE — CARE COORDINATION
4/14, SW met with patient at bedside to confirm discharge plan which is home once medically cleared for discharge. Discussed recent PT-20/24 and ambulated 150 feet. Patient does not feel she has any needs at this time. Patient to have boyfriend for transportation. SW to follow for further discharge planning needs.       SANTOS Leal  [facility] Case Management  [phone]

## 2021-04-15 ENCOUNTER — TELEPHONE (OUTPATIENT)
Dept: CARDIOLOGY CLINIC | Age: 56
End: 2021-04-15

## 2021-04-15 NOTE — TELEPHONE ENCOUNTER
JOSE CARLOS Curry Shelly; Peterson Steele, RN             Please schedule follow up with Dr. Hal Estrada in 4-8 weeks. Thanks! Patient's phone is invalid.    [phone] (home)      Mailed f/u apt with DR Hal Estrada to her address   [address] [place] [address]

## 2022-12-06 ENCOUNTER — TELEPHONE (OUTPATIENT)
Dept: CARDIOLOGY CLINIC | Age: 57
End: 2022-12-06

## 2022-12-06 PROBLEM — R07.89 ATYPICAL CHEST PAIN: Status: ACTIVE | Noted: 2021-04-11

## 2022-12-06 PROBLEM — I25.10 CORONARY ARTERY DISEASE INVOLVING NATIVE CORONARY ARTERY OF NATIVE HEART WITHOUT ANGINA PECTORIS: Status: ACTIVE | Noted: 2022-12-06

## 2023-01-02 PROBLEM — E78.00 PURE HYPERCHOLESTEROLEMIA: Status: ACTIVE | Noted: 2023-01-02

## 2023-01-30 ENCOUNTER — OFFICE VISIT (OUTPATIENT)
Dept: CARDIOLOGY CLINIC | Age: 58
End: 2023-01-30
Payer: MEDICARE

## 2023-01-30 VITALS
OXYGEN SATURATION: 97 % | RESPIRATION RATE: 16 BRPM | BODY MASS INDEX: 27.07 KG/M2 | WEIGHT: 178.6 LBS | HEART RATE: 81 BPM | SYSTOLIC BLOOD PRESSURE: 110 MMHG | DIASTOLIC BLOOD PRESSURE: 62 MMHG | HEIGHT: 68 IN

## 2023-01-30 DIAGNOSIS — I44.7 LEFT BUNDLE BRANCH BLOCK: ICD-10-CM

## 2023-01-30 DIAGNOSIS — E78.00 PURE HYPERCHOLESTEROLEMIA: ICD-10-CM

## 2023-01-30 DIAGNOSIS — I25.5 ISCHEMIC CARDIOMYOPATHY: ICD-10-CM

## 2023-01-30 DIAGNOSIS — G80.9 CEREBRAL PALSY, UNSPECIFIED TYPE (HCC): ICD-10-CM

## 2023-01-30 DIAGNOSIS — I25.10 CORONARY ARTERY DISEASE INVOLVING NATIVE CORONARY ARTERY OF NATIVE HEART WITHOUT ANGINA PECTORIS: Primary | ICD-10-CM

## 2023-01-30 DIAGNOSIS — I10 PRIMARY HYPERTENSION: ICD-10-CM

## 2023-01-30 PROCEDURE — 3074F SYST BP LT 130 MM HG: CPT | Performed by: INTERNAL MEDICINE

## 2023-01-30 PROCEDURE — 99215 OFFICE O/P EST HI 40 MIN: CPT | Performed by: INTERNAL MEDICINE

## 2023-01-30 PROCEDURE — 93000 ELECTROCARDIOGRAM COMPLETE: CPT | Performed by: INTERNAL MEDICINE

## 2023-01-30 PROCEDURE — 3078F DIAST BP <80 MM HG: CPT | Performed by: INTERNAL MEDICINE

## 2023-01-30 RX ORDER — METOPROLOL SUCCINATE 25 MG/1
25 TABLET, EXTENDED RELEASE ORAL DAILY
Qty: 30 TABLET | Refills: 5 | Status: SHIPPED | OUTPATIENT
Start: 2023-01-30

## 2023-01-30 RX ORDER — LISINOPRIL 40 MG/1
40 TABLET ORAL DAILY
Qty: 30 TABLET | Refills: 3 | Status: SHIPPED | OUTPATIENT
Start: 2023-01-30

## 2023-01-30 RX ORDER — ROSUVASTATIN CALCIUM 20 MG/1
20 TABLET, COATED ORAL DAILY
Qty: 30 TABLET | Refills: 5 | Status: SHIPPED | OUTPATIENT
Start: 2023-01-30

## 2023-01-30 RX ORDER — VENLAFAXINE HYDROCHLORIDE 150 MG/1
CAPSULE, EXTENDED RELEASE ORAL
COMMUNITY
Start: 2023-01-17

## 2023-01-30 NOTE — PROGRESS NOTES
OUTPATIENT CARDIOLOGY FOLLOW-UP    Name: Josseline Cortes    Age: 62 y.o. Primary Care Physician: Maryann Parihs MD    Date of Service: 1/30/2023    Chief Complaint: Coronary atherosclerosis, ischemic cardiomyopathy, left bundle branch block, hypertension, cerebral palsy    Interim History: Since evaluation by Madalyn Acevedo nearly 2 years earlier in the face of coronary atherosclerosis with predominant involvement of the left anterior descending in addition to that of left ventricular systolic function at the lower limits of normal and a chronically noted left bundle branch block conduction pattern, the patient denies interim symptoms of anginal-like chest discomfort and remains active with functional capabilities of approximately 4-5 METs. She does relate the development of mild (functional class II) exertional dyspnea in the absence of additional manifestations of decompensated left ventricular systolic dysfunction or volume overload and denies arrhythmia related manifestations. Due to misunderstanding potentially in part related to her cerebral palsy,, she had self discontinued her aspirin and rosuvastatin with continued therapy exclusively with lisinopril. In addition, within the past 6 weeks, she underwent an echocardiogram not available for review but reportedly demonstrating evidence of a normal-sized left ventricular chamber with abnormal septal motion in the face of her chronic left bundle branch block and global hypokinesis with moderate left ventricular systolic dysfunction and an estimated ejection fraction of 40% with stage I diastolic dysfunction and both mild aortic insufficiency and mitral regurgitation. At the time of present evaluation she remains normotensive. Review of Systems:    The remainder of a complete multisystem review including consitutional, central nervous, respiratory, circulatory, gastrointestinal, genitourinary, endocrinologic, hematologic, musculoskeletal and psychiatric are negative.     Past Medical History:  Past Medical History:   Diagnosis Date    CAD (coronary artery disease)     Cerebral palsy ([facility] [address]     Hypertension        Past Surgical History:  Past Surgical History:   Procedure Laterality Date    CARDIAC CATHETERIZATION  2021    Dr. Candida Rubio [facility] EF 50 %       Family History:  Family History   Problem Relation Age of Onset    Hypertension Mother     Diabetes Mother     Alzheimer's Disease Mother     Coronary Art Dis Father         s/p stents  s/p CABG x5    Heart Attack Father 62       Social History:  Social History     Socioeconomic History    Marital status:      Spouse name: Not on file    Number of children: Not on file    Years of education: Not on file    Highest education level: Not on file   Occupational History    Not on file   Tobacco Use    Smoking status: Former     Packs/day: 0.50     Years: 20.00     Pack years: 10.00     Types: Cigarettes     Quit date: 10/1/2022     Years since quittin.3    Smokeless tobacco: Never   Vaping Use    Vaping Use: Every day    Substances: Nicotine   Substance and Sexual Activity    Alcohol use: Not Currently    Drug use: Yes     Frequency: 7.0 times per week     Types: Marijuana (Weed)     Comment: uses daily    Sexual activity: Not on file   Other Topics Concern    Not on file   Social History Narrative    Drinks 1 cup of coffee & 1-2 Cola daily     Social Determinants of Health     Financial Resource Strain: Not on file   Food Insecurity: Not on file   Transportation Needs: Not on file   Physical Activity: Not on file   Stress: Not on file   Social Connections: Not on file   Intimate Partner Violence: Not on file   Housing Stability: Not on file       Allergies:  No Known Allergies    Current Medications:  Current Outpatient Medications   Medication Sig Dispense Refill    venlafaxine (EFFEXOR XR) 150 MG extended release capsule TAKE 1 CAPSULE BY MOUTH EVERY DAY      nitroGLYCERIN (NITROSTAT) 0.4 MG SL tablet up to max of 3 total doses. If no relief after 1 dose, call 911. 25 tablet 3    lisinopril (PRINIVIL;ZESTRIL) 40 MG tablet Take 1 tablet by mouth daily 30 tablet 3    clonazePAM (KLONOPIN) 0.5 MG tablet Take 0.5 mg by mouth 2 times daily. ibuprofen (ADVIL;MOTRIN) 200 MG tablet Take 600 mg by mouth every 8 hours as needed for Pain      COLLAGEN PO Take by mouth daily      Biotin w/ Vitamins C & E (HAIR/SKIN/NAILS) 1250-7.5-7.5 MCG-MG-UNT CHEW Take 1 tablet by mouth as needed      aspirin 81 MG chewable tablet Take 1 tablet by mouth daily (Patient not taking: Reported on 1/30/2023) 30 tablet 3    rosuvastatin (CRESTOR) 20 MG tablet Take 1 tablet by mouth nightly (Patient not taking: Reported on 1/30/2023) 30 tablet 3     No current facility-administered medications for this visit. Physical Exam:  /62 (Site: Right Upper Arm, Position: Sitting, Cuff Size: Medium Adult)   Pulse 81   Resp 16   Ht 5' 8\" (1.727 m)   Wt 178 lb 9.6 oz (81 kg)   SpO2 97%   BMI 27.16 kg/m²   Wt Readings from Last 3 Encounters:   01/30/23 178 lb 9.6 oz (81 kg)   04/14/21 177 lb (80.3 kg)   02/06/20 165 lb (74.8 kg)     The patient is awake, alert and in no discomfort or distress. No gross musculoskeletal deformity is present. No significant skin or nail changes are present. Gross examination of head, eyes, nose and throat are negative. Jugular venous pressure is normal and no carotid bruits are present. Normal respiratory effort is noted with no accessory muscle usage present. Lung fields are clear to ascultation. Cardiac examination is notable for a regular rate and rhythm with no palpable thrill. No gallop rhythm or cardiac murmur are identified. A benign abdominal examination is present with no masses or organomegaly. Intact pulses are present throughout all extremities and no peripheral edema is present. No focal neurologic deficits are present.     Laboratory Tests:  Lab Results   Component Value Date CREATININE 0.7 04/14/2021    BUN 7 04/14/2021     04/14/2021    K 4.1 04/14/2021     04/14/2021    CO2 24 04/14/2021     No results found for: BNP  Lab Results   Component Value Date/Time    WBC 7.1 04/14/2021 10:05 AM    RBC 4.10 04/14/2021 10:05 AM    HGB 13.4 04/14/2021 10:05 AM    HCT 41.1 04/14/2021 10:05 AM    .2 04/14/2021 10:05 AM    MCH 32.7 04/14/2021 10:05 AM    MCHC 32.6 04/14/2021 10:05 AM    RDW 12.1 04/14/2021 10:05 AM     04/14/2021 10:05 AM    MPV 9.8 04/14/2021 10:05 AM     No results for input(s): ALKPHOS, ALT, AST, PROT, BILITOT, BILIDIR, LABALBU in the last 72 hours. Lab Results   Component Value Date/Time    MG 1.8 04/12/2021 04:24 AM     No results found for: PROTIME, INR  Lab Results   Component Value Date/Time    TSH 1.320 04/11/2021 06:37 PM     No components found for: CHLPL  Lab Results   Component Value Date    TRIG 105 04/12/2021     Lab Results   Component Value Date    HDL 56 04/12/2021     Lab Results   Component Value Date    LDLCALC 69 04/12/2021       Cardiac Tests:  ECG: A resting electrocardiogram demonstrates evidence of sinus rhythm with a left bundle branch block  Last Echocardiogram: An echocardiogram of December, 2020 reported a normal-sized left ventricular chamber with abnormal septal motion in the face of a chronically noted left bundle branch block as well as global hypokinesis with moderate left ventricular systolic dysfunction estimated ejection fraction of 40% and stage I diastolic dysfunction. Mild aortic insufficiency and mitral regurgitation were additionally reported      ASSESSMENT / PLAN: On a clinical basis, the patient presently appears compensated from a cardiovascular standpoint in the face of her known coronary atherosclerosis and moderate left ventricular systolic dysfunction potentially on the the basis of her coronary atherosclerosis.   I have attempted to discuss this with her and have presently recommended optimization of medical therapy both with resumption of her aspirin and rosuvastatin as well as the continuation of her angiotensin-converting enzyme inhibitor and the addition of a beta-blocker to assist stabilization of left ventricular systolic function. Additionally recommend the discontinuation of her nonsteroidal anti-inflammatory therapy based on its adverse cardiovascular risks both in the face of her coronary atherosclerosis and left ventricular systolic dysfunction. Continued appropriate symptom monitoring will be advisable in addition to that of aggressive risk factor modification of blood pressure and serum lipids in attempt to reduce risk of future atherosclerotic development and goals of maintaining LDL cholesterol levels below 70 mg/dL. I presently plan her clinical reevaluation in approximately 6 months and would happily reassess her in the interim should additional cardiovascular difficulties or concerns arise. The patient's current medication list, allergies, problem list and results of all previously ordered testing were reviewed at today's visit. Follow-up office visit in 6 months      Note: This report was completed using computerized voice recognition software. Every effort has been made to ensure accuracy, however; inadvertent computerized transcription errors may be present. Mickey Villalobos.  Jc Wilhelm, [facility]

## 2023-02-22 RX ORDER — ROSUVASTATIN CALCIUM 20 MG/1
TABLET, COATED ORAL
Qty: 30 TABLET | Refills: 5 | Status: SHIPPED | OUTPATIENT
Start: 2023-02-22

## 2023-02-23 RX ORDER — METOPROLOL SUCCINATE 25 MG/1
TABLET, EXTENDED RELEASE ORAL
Qty: 30 TABLET | Refills: 5 | Status: SHIPPED | OUTPATIENT
Start: 2023-02-23

## 2023-04-17 RX ORDER — LISINOPRIL 40 MG/1
TABLET ORAL
Qty: 90 TABLET | Refills: 1 | Status: SHIPPED | OUTPATIENT
Start: 2023-04-17

## 2023-07-10 ENCOUNTER — TELEPHONE (OUTPATIENT)
Dept: CASE MANAGEMENT | Age: 58
End: 2023-07-10

## 2023-07-10 NOTE — TELEPHONE ENCOUNTER
I called the patient and left a message reminding her of the CT lung screening at [place] on 7/11/2023 at 2:00 pm with an 1:30 pm arrival.  If unable to keep this appt call the office at [phone] to get rescheduled.           Electronically signed by Graeme Posada on 7/10/23 at 3:24 PM EDT

## 2023-07-11 ENCOUNTER — HOSPITAL ENCOUNTER (OUTPATIENT)
Dept: CT IMAGING | Age: 58
Discharge: HOME | End: 2023-07-13
Payer: MEDICARE

## 2023-07-11 DIAGNOSIS — J44.9 CHRONIC OBSTRUCTIVE PULMONARY DISEASE, UNSPECIFIED COPD TYPE (HCC): ICD-10-CM

## 2023-07-11 DIAGNOSIS — Z87.891 FORMER SMOKER: ICD-10-CM

## 2023-07-11 PROCEDURE — 71271 CT THORAX LUNG CANCER SCR C-: CPT

## 2023-07-12 ENCOUNTER — TELEPHONE (OUTPATIENT)
Dept: CASE MANAGEMENT | Age: 58
End: 2023-07-12

## 2023-07-12 NOTE — TELEPHONE ENCOUNTER
No call, encounter opened to process CT Lung Screening. CT Lung Screen: 7/11/2023    IMPRESSION:  1. There is no pulmonary infiltrate, mass or suspicious pulmonary nodule. LUNG RADS:  Lung-RADS 1 - Negative ()     Management:  12 month screening LDCT     RECOMMENDATIONS:  If you would like to register your patient with the [place], please contact the Nurse Navigator at  [phone]. Pack years: 10    Social History     Tobacco Use  Smoking Status: Former Smoker    Start Date:    Quit Date: 10/01/2022   Types: Cigarettes   Packs/Day: 0.5   Years: 20   Pack Years: 10   Smokeless Tobacco: Never         Results letter sent to patient via my chart or mailed.      [address]

## 2023-09-22 RX ORDER — ROSUVASTATIN CALCIUM 20 MG/1
TABLET, COATED ORAL
Qty: 90 TABLET | Refills: 1 | Status: SHIPPED | OUTPATIENT
Start: 2023-09-22

## 2023-09-27 RX ORDER — LISINOPRIL 40 MG/1
TABLET ORAL
Qty: 90 TABLET | Refills: 1 | Status: SHIPPED | OUTPATIENT
Start: 2023-09-27

## 2023-10-02 RX ORDER — METOPROLOL SUCCINATE 25 MG/1
TABLET, EXTENDED RELEASE ORAL
Qty: 90 TABLET | Refills: 1 | Status: SHIPPED | OUTPATIENT
Start: 2023-10-02

## 2024-01-09 RX ORDER — LISINOPRIL 40 MG/1
TABLET ORAL
Qty: 90 TABLET | Refills: 1 | Status: SHIPPED | OUTPATIENT
Start: 2024-01-09

## 2024-02-27 ENCOUNTER — TELEPHONE (OUTPATIENT)
Dept: GENERAL RADIOLOGY | Age: 59
End: 2024-02-27

## 2024-02-27 NOTE — TELEPHONE ENCOUNTER
PT CALLED TO SCHEDULED. I LET HER KNOW THAT THE ORDER IS A TRANSCRIBED ORDER AND WE ARE NOT UNABLE TO SCHEDULE HER WITH A TRANSCRIBED ORDER. AM

## 2024-04-08 RX ORDER — ROSUVASTATIN CALCIUM 20 MG/1
TABLET, COATED ORAL
Qty: 90 TABLET | Refills: 1 | Status: SHIPPED | OUTPATIENT
Start: 2024-04-08

## 2024-04-10 RX ORDER — METOPROLOL SUCCINATE 25 MG/1
TABLET, EXTENDED RELEASE ORAL
Qty: 30 TABLET | Refills: 0 | Status: SHIPPED | OUTPATIENT
Start: 2024-04-10

## 2024-05-01 ENCOUNTER — HOSPITAL ENCOUNTER (OUTPATIENT)
Dept: GENERAL RADIOLOGY | Age: 59
Discharge: HOME | End: 2024-05-03
Payer: MEDICARE

## 2024-05-01 VITALS — WEIGHT: 177 LBS | BODY MASS INDEX: 26.83 KG/M2 | HEIGHT: 68 IN

## 2024-05-01 DIAGNOSIS — Z12.31 ENCOUNTER FOR SCREENING MAMMOGRAM FOR MALIGNANT NEOPLASM OF BREAST: ICD-10-CM

## 2024-05-01 PROCEDURE — 77063 BREAST TOMOSYNTHESIS BI: CPT

## 2024-05-16 RX ORDER — METOPROLOL SUCCINATE 25 MG/1
TABLET, EXTENDED RELEASE ORAL
Qty: 90 TABLET | Refills: 1 | Status: SHIPPED | OUTPATIENT
Start: 2024-05-16

## 2024-11-15 RX ORDER — ROSUVASTATIN CALCIUM 20 MG/1
TABLET, COATED ORAL
Qty: 90 TABLET | Refills: 1 | Status: SHIPPED | OUTPATIENT
Start: 2024-11-15

## 2025-03-18 ENCOUNTER — TELEPHONE (OUTPATIENT)
Dept: ADMINISTRATIVE | Age: 60
End: 2025-03-18

## 2025-04-11 ENCOUNTER — TELEPHONE (OUTPATIENT)
Dept: CARDIOLOGY CLINIC | Age: 60
End: 2025-04-11

## 2025-05-30 ENCOUNTER — OFFICE VISIT (OUTPATIENT)
Dept: CARDIOLOGY CLINIC | Age: 60
End: 2025-05-30

## 2025-05-30 VITALS
HEIGHT: 68 IN | HEART RATE: 85 BPM | DIASTOLIC BLOOD PRESSURE: 62 MMHG | RESPIRATION RATE: 20 BRPM | SYSTOLIC BLOOD PRESSURE: 128 MMHG | BODY MASS INDEX: 26.37 KG/M2 | WEIGHT: 174 LBS

## 2025-05-30 DIAGNOSIS — R06.02 SHORTNESS OF BREATH: ICD-10-CM

## 2025-05-30 DIAGNOSIS — I25.10 CORONARY ARTERY DISEASE INVOLVING NATIVE CORONARY ARTERY OF NATIVE HEART WITHOUT ANGINA PECTORIS: Primary | ICD-10-CM

## 2025-05-30 RX ORDER — METOPROLOL SUCCINATE 25 MG/1
50 TABLET, EXTENDED RELEASE ORAL DAILY
Qty: 90 TABLET | Refills: 1 | Status: SHIPPED | OUTPATIENT
Start: 2025-05-30

## 2025-05-30 ASSESSMENT — ENCOUNTER SYMPTOMS
SHORTNESS OF BREATH: 1
CHEST TIGHTNESS: 0
ABDOMINAL PAIN: 0
VOMITING: 0
NAUSEA: 0
BLOOD IN STOOL: 0
COUGH: 0
BACK PAIN: 0

## 2025-05-30 NOTE — PROGRESS NOTES
OFFICE VISIT    NAME: Florencio Mantilla     YOB: 1965   AGE: 59 y.o.   MEDICAL RECORD NUMBER: [id number]       CONSULTATION INFORMATION:   DATE OF CLINIC CONSULTATION: 5/30/2025   PRINCIPLE DIAGNOSIS / reason for visit: Palpitations, shortness of breath    CARE TEAM MEMBERS    PCP : Prabhakar Crum MD     REFERRING PROVIDER: No ref. provider found     HISTORY OF PRESENT ILLNESS:   Florencio Mantilla is a 59 y.o. female referred for evaluation of palpitations and shortness of breath. Past medical history of HTN, cerebral palsy, non obstructive CAD, NICM (EF 40%), LBBB        Presents to clinic today alone.  She reports that she been having symptoms of palpitations, shortness of breath.  She had an event monitor which was abnormal but I do not have the result.    PERTINENT RADIOGRAPHIC/DIAGNOSTICS:   -TTE 12/1/2022:      -ECG 5/30/2025:  Normal sinus rhythm, LBBB, PVC  - Cardiac cath 4/14/2021:  LEFT VENTRICULOGRAM:  Revealed an ejection fraction of 50%.  No mitral  regurgitation was noted.     IMPRESSION:  1.  50% to 60% ostial and 40% to 50% discrete mid LAD stenosis.  2.  30% discrete mid RCA stenosis.  3.  LVEDP 16 mmHg.  4.  Ejection fraction 50%.        PAST HISTORY:   Past Medical History:   Diagnosis Date    CAD (coronary artery disease)     Cerebral palsy (HCC)     Hypertension        Past Surgical History:   Procedure Laterality Date    BREAST ENHANCEMENT SURGERY      CARDIAC CATHETERIZATION  04/14/2021    Dr. field [facility] EF 50 %      Family History   Problem Relation Age of Onset    Hypertension Mother     Diabetes Mother     Alzheimer's Disease Mother     Coronary Art Dis Father         s/p stents  s/p CABG x5    Heart Attack Father 57      Social History     Socioeconomic History    Marital status:      Spouse name: Not on file    Number of children: Not on file    Years of education: Not on file    Highest education level: Not on file   Occupational History    Not on file

## 2025-07-01 ENCOUNTER — TELEPHONE (OUTPATIENT)
Dept: CARDIOLOGY | Age: 60
End: 2025-07-01

## 2025-07-16 ENCOUNTER — HOSPITAL ENCOUNTER (OUTPATIENT)
Dept: CARDIOLOGY | Age: 60
Discharge: HOME | End: 2025-07-18
Payer: MEDICARE

## 2025-07-16 VITALS
WEIGHT: 174 LBS | BODY MASS INDEX: 26.37 KG/M2 | DIASTOLIC BLOOD PRESSURE: 62 MMHG | SYSTOLIC BLOOD PRESSURE: 128 MMHG | HEIGHT: 68 IN

## 2025-07-16 DIAGNOSIS — R06.02 SHORTNESS OF BREATH: ICD-10-CM

## 2025-07-16 LAB
ECHO AO ASC DIAM: 3.1 CM
ECHO AO ASCENDING AORTA INDEX: 1.61 CM/M2
ECHO AV AREA PEAK VELOCITY: 2.4 CM2
ECHO AV AREA VTI: 2.6 CM2
ECHO AV AREA/BSA PEAK VELOCITY: 1.2 CM2/M2
ECHO AV AREA/BSA VTI: 1.3 CM2/M2
ECHO AV MEAN GRADIENT: 2 MMHG
ECHO AV MEAN VELOCITY: 0.7 M/S
ECHO AV PEAK GRADIENT: 5 MMHG
ECHO AV PEAK VELOCITY: 1.1 M/S
ECHO AV VELOCITY RATIO: 0.82
ECHO AV VTI: 17.3 CM
ECHO BSA: 1.95 M2
ECHO EST RA PRESSURE: 3 MMHG
ECHO LA DIAMETER INDEX: 1.81 CM/M2
ECHO LA DIAMETER: 3.5 CM
ECHO LA VOL A-L A2C: 43 ML (ref 22–52)
ECHO LA VOL A-L A4C: 43 ML (ref 22–52)
ECHO LA VOL MOD A2C: 42 ML (ref 22–52)
ECHO LA VOL MOD A4C: 37 ML (ref 22–52)
ECHO LA VOLUME AREA LENGTH: 44 ML
ECHO LA VOLUME INDEX A-L A2C: 22 ML/M2 (ref 16–34)
ECHO LA VOLUME INDEX A-L A4C: 22 ML/M2 (ref 16–34)
ECHO LA VOLUME INDEX AREA LENGTH: 23 ML/M2 (ref 16–34)
ECHO LA VOLUME INDEX MOD A2C: 22 ML/M2 (ref 16–34)
ECHO LA VOLUME INDEX MOD A4C: 19 ML/M2 (ref 16–34)
ECHO LV EJECTION FRACTION A2C: 44 %
ECHO LV EJECTION FRACTION A4C: 42 %
ECHO LV FRACTIONAL SHORTENING: 22 % (ref 28–44)
ECHO LV INTERNAL DIMENSION DIASTOLE INDEX: 2.33 CM/M2
ECHO LV INTERNAL DIMENSION DIASTOLIC: 4.5 CM (ref 3.9–5.3)
ECHO LV INTERNAL DIMENSION SYSTOLIC INDEX: 1.81 CM/M2
ECHO LV INTERNAL DIMENSION SYSTOLIC: 3.5 CM
ECHO LV ISOVOLUMETRIC RELAXATION TIME (IVRT): 107.3 MS
ECHO LV IVSD: 1.1 CM (ref 0.6–0.9)
ECHO LV IVSS: 1.3 CM
ECHO LV MASS 2D: 164 G (ref 67–162)
ECHO LV MASS INDEX 2D: 85 G/M2 (ref 43–95)
ECHO LV POSTERIOR WALL DIASTOLIC: 1 CM (ref 0.6–0.9)
ECHO LV POSTERIOR WALL SYSTOLIC: 1.3 CM
ECHO LV RELATIVE WALL THICKNESS RATIO: 0.44
ECHO LVOT AREA: 3.1 CM2
ECHO LVOT AV VTI INDEX: 0.84
ECHO LVOT DIAM: 2 CM
ECHO LVOT MEAN GRADIENT: 2 MMHG
ECHO LVOT PEAK GRADIENT: 3 MMHG
ECHO LVOT PEAK VELOCITY: 0.9 M/S
ECHO LVOT STROKE VOLUME INDEX: 23.8 ML/M2
ECHO LVOT SV: 45.8 ML
ECHO LVOT VTI: 14.6 CM
ECHO MV "A" WAVE DURATION: 135 MSEC
ECHO MV A VELOCITY: 0.78 M/S
ECHO MV AREA PHT: 3.6 CM2
ECHO MV AREA VTI: 2.5 CM2
ECHO MV E DECELERATION TIME (DT): 195.6 MS
ECHO MV E VELOCITY: 0.66 M/S
ECHO MV E/A RATIO: 0.85
ECHO MV LVOT VTI INDEX: 1.25
ECHO MV MAX VELOCITY: 1 M/S
ECHO MV MEAN GRADIENT: 1 MMHG
ECHO MV MEAN VELOCITY: 0.5 M/S
ECHO MV PEAK GRADIENT: 4 MMHG
ECHO MV PRESSURE HALF TIME (PHT): 60.4 MS
ECHO MV VTI: 18.2 CM
ECHO PV MAX VELOCITY: 0.9 M/S
ECHO PV MEAN GRADIENT: 2 MMHG
ECHO PV MEAN VELOCITY: 0.6 M/S
ECHO PV PEAK GRADIENT: 3 MMHG
ECHO PV VTI: 14.1 CM
ECHO RIGHT VENTRICULAR SYSTOLIC PRESSURE (RVSP): 21 MMHG
ECHO RV INTERNAL DIMENSION: 2.9 CM
ECHO TV REGURGITANT MAX VELOCITY: 2.14 M/S
ECHO TV REGURGITANT PEAK GRADIENT: 18 MMHG

## 2025-07-16 PROCEDURE — 93306 TTE W/DOPPLER COMPLETE: CPT

## 2025-07-24 LAB
ECHO AO ASC DIAM: 3.1 CM
ECHO AO ASCENDING AORTA INDEX: 1.61 CM/M2
ECHO AV AREA PEAK VELOCITY: 2.4 CM2
ECHO AV AREA VTI: 2.6 CM2
ECHO AV AREA/BSA PEAK VELOCITY: 1.2 CM2/M2
ECHO AV AREA/BSA VTI: 1.3 CM2/M2
ECHO AV MEAN GRADIENT: 2 MMHG
ECHO AV MEAN VELOCITY: 0.7 M/S
ECHO AV PEAK GRADIENT: 5 MMHG
ECHO AV PEAK VELOCITY: 1.1 M/S
ECHO AV VELOCITY RATIO: 0.82
ECHO AV VTI: 17.3 CM
ECHO BSA: 1.95 M2
ECHO EST RA PRESSURE: 3 MMHG
ECHO LA DIAMETER INDEX: 1.81 CM/M2
ECHO LA DIAMETER: 3.5 CM
ECHO LA VOL A-L A2C: 43 ML (ref 22–52)
ECHO LA VOL A-L A4C: 43 ML (ref 22–52)
ECHO LA VOL MOD A2C: 42 ML (ref 22–52)
ECHO LA VOL MOD A4C: 37 ML (ref 22–52)
ECHO LA VOLUME AREA LENGTH: 44 ML
ECHO LA VOLUME INDEX A-L A2C: 22 ML/M2 (ref 16–34)
ECHO LA VOLUME INDEX A-L A4C: 22 ML/M2 (ref 16–34)
ECHO LA VOLUME INDEX AREA LENGTH: 23 ML/M2 (ref 16–34)
ECHO LA VOLUME INDEX MOD A2C: 22 ML/M2 (ref 16–34)
ECHO LA VOLUME INDEX MOD A4C: 19 ML/M2 (ref 16–34)
ECHO LV EJECTION FRACTION 3D: 40 %
ECHO LV EJECTION FRACTION A2C: 44 %
ECHO LV EJECTION FRACTION A4C: 42 %
ECHO LV FRACTIONAL SHORTENING: 22 % (ref 28–44)
ECHO LV INTERNAL DIMENSION DIASTOLE INDEX: 2.33 CM/M2
ECHO LV INTERNAL DIMENSION DIASTOLIC: 4.5 CM (ref 3.9–5.3)
ECHO LV INTERNAL DIMENSION SYSTOLIC INDEX: 1.81 CM/M2
ECHO LV INTERNAL DIMENSION SYSTOLIC: 3.5 CM
ECHO LV ISOVOLUMETRIC RELAXATION TIME (IVRT): 107.3 MS
ECHO LV IVSD: 1.1 CM (ref 0.6–0.9)
ECHO LV IVSS: 1.3 CM
ECHO LV MASS 2D: 164 G (ref 67–162)
ECHO LV MASS INDEX 2D: 85 G/M2 (ref 43–95)
ECHO LV POSTERIOR WALL DIASTOLIC: 1 CM (ref 0.6–0.9)
ECHO LV POSTERIOR WALL SYSTOLIC: 1.3 CM
ECHO LV RELATIVE WALL THICKNESS RATIO: 0.44
ECHO LVOT AREA: 3.1 CM2
ECHO LVOT AV VTI INDEX: 0.84
ECHO LVOT DIAM: 2 CM
ECHO LVOT MEAN GRADIENT: 2 MMHG
ECHO LVOT PEAK GRADIENT: 3 MMHG
ECHO LVOT PEAK VELOCITY: 0.9 M/S
ECHO LVOT STROKE VOLUME INDEX: 23.8 ML/M2
ECHO LVOT SV: 45.8 ML
ECHO LVOT VTI: 14.6 CM
ECHO MV "A" WAVE DURATION: 135 MSEC
ECHO MV A VELOCITY: 0.78 M/S
ECHO MV AREA PHT: 3.6 CM2
ECHO MV AREA VTI: 2.5 CM2
ECHO MV E DECELERATION TIME (DT): 195.6 MS
ECHO MV E VELOCITY: 0.66 M/S
ECHO MV E/A RATIO: 0.85
ECHO MV LVOT VTI INDEX: 1.25
ECHO MV MAX VELOCITY: 1 M/S
ECHO MV MEAN GRADIENT: 1 MMHG
ECHO MV MEAN VELOCITY: 0.5 M/S
ECHO MV PEAK GRADIENT: 4 MMHG
ECHO MV PRESSURE HALF TIME (PHT): 60.4 MS
ECHO MV VTI: 18.2 CM
ECHO PV MAX VELOCITY: 0.9 M/S
ECHO PV MEAN GRADIENT: 2 MMHG
ECHO PV MEAN VELOCITY: 0.6 M/S
ECHO PV PEAK GRADIENT: 3 MMHG
ECHO PV VTI: 14.1 CM
ECHO RIGHT VENTRICULAR SYSTOLIC PRESSURE (RVSP): 21 MMHG
ECHO RV INTERNAL DIMENSION: 2.9 CM
ECHO TV REGURGITANT MAX VELOCITY: 2.14 M/S
ECHO TV REGURGITANT PEAK GRADIENT: 18 MMHG

## 2025-08-12 RX ORDER — ROSUVASTATIN CALCIUM 20 MG/1
20 TABLET, COATED ORAL DAILY
Qty: 90 TABLET | Refills: 1 | Status: CANCELLED | OUTPATIENT
Start: 2025-08-12